# Patient Record
Sex: MALE | Race: WHITE | NOT HISPANIC OR LATINO | Employment: FULL TIME | URBAN - METROPOLITAN AREA
[De-identification: names, ages, dates, MRNs, and addresses within clinical notes are randomized per-mention and may not be internally consistent; named-entity substitution may affect disease eponyms.]

---

## 2017-01-17 ENCOUNTER — LAB (OUTPATIENT)
Dept: PULMONOLOGY | Facility: CLINIC | Age: 57
End: 2017-01-17

## 2017-01-17 DIAGNOSIS — R06.02 SOB (SHORTNESS OF BREATH): Primary | ICD-10-CM

## 2017-01-17 LAB
25(OH)D3 SERPL-MCNC: 42.8 NG/ML
ALBUMIN SERPL-MCNC: 4.6 G/DL (ref 3.2–4.8)
ALBUMIN/GLOB SERPL: 1.5 G/DL (ref 1.5–2.5)
ALP SERPL-CCNC: 116 U/L (ref 25–100)
ALT SERPL W P-5'-P-CCNC: 28 U/L (ref 7–40)
ANION GAP SERPL CALCULATED.3IONS-SCNC: 7 MMOL/L (ref 3–11)
ARTICHOKE IGE QN: 163 MG/DL (ref 0–130)
AST SERPL-CCNC: 20 U/L (ref 0–33)
BASOPHILS # BLD AUTO: 0.06 10*3/MM3 (ref 0–0.2)
BASOPHILS NFR BLD AUTO: 1 % (ref 0–1)
BILIRUB SERPL-MCNC: 0.7 MG/DL (ref 0.3–1.2)
BUN BLD-MCNC: 19 MG/DL (ref 9–23)
BUN/CREAT SERPL: 15.8 (ref 7–25)
CALCIUM SPEC-SCNC: 10.4 MG/DL (ref 8.7–10.4)
CHLORIDE SERPL-SCNC: 104 MMOL/L (ref 99–109)
CHOLEST SERPL-MCNC: 267 MG/DL (ref 0–200)
CO2 SERPL-SCNC: 35 MMOL/L (ref 20–31)
CREAT BLD-MCNC: 1.2 MG/DL (ref 0.6–1.3)
DEPRECATED RDW RBC AUTO: 47 FL (ref 37–54)
EOSINOPHIL # BLD AUTO: 0.12 10*3/MM3 (ref 0.1–0.3)
EOSINOPHIL NFR BLD AUTO: 2 % (ref 0–3)
ERYTHROCYTE [DISTWIDTH] IN BLOOD BY AUTOMATED COUNT: 14.1 % (ref 11.3–14.5)
GFR SERPL CREATININE-BSD FRML MDRD: 63 ML/MIN/1.73
GLOBULIN UR ELPH-MCNC: 3.1 GM/DL
GLUCOSE BLD-MCNC: 97 MG/DL (ref 70–100)
HBA1C MFR BLD: 4.7 % (ref 4.8–5.6)
HCT VFR BLD AUTO: 43.7 % (ref 38.9–50.9)
HDLC SERPL-MCNC: 43 MG/DL (ref 40–60)
HGB BLD-MCNC: 15.4 G/DL (ref 13.1–17.5)
IMM GRANULOCYTES # BLD: 0.01 10*3/MM3 (ref 0–0.03)
IMM GRANULOCYTES NFR BLD: 0.2 % (ref 0–0.6)
LYMPHOCYTES # BLD AUTO: 2.31 10*3/MM3 (ref 0.6–4.8)
LYMPHOCYTES NFR BLD AUTO: 38.1 % (ref 24–44)
MCH RBC QN AUTO: 32 PG (ref 27–31)
MCHC RBC AUTO-ENTMCNC: 35.2 G/DL (ref 32–36)
MCV RBC AUTO: 90.9 FL (ref 80–99)
MONOCYTES # BLD AUTO: 0.66 10*3/MM3 (ref 0–1)
MONOCYTES NFR BLD AUTO: 10.9 % (ref 0–12)
NEUTROPHILS # BLD AUTO: 2.9 10*3/MM3 (ref 1.5–8.3)
NEUTROPHILS NFR BLD AUTO: 47.8 % (ref 41–71)
PHOSPHATE SERPL-MCNC: 3.8 MG/DL (ref 2.4–5.1)
PLATELET # BLD AUTO: 272 10*3/MM3 (ref 150–450)
PMV BLD AUTO: 9 FL (ref 6–12)
POTASSIUM BLD-SCNC: 3.9 MMOL/L (ref 3.5–5.5)
PROT SERPL-MCNC: 7.7 G/DL (ref 5.7–8.2)
RBC # BLD AUTO: 4.81 10*6/MM3 (ref 4.2–5.76)
SODIUM BLD-SCNC: 146 MMOL/L (ref 132–146)
TRIGL SERPL-MCNC: 273 MG/DL (ref 0–150)
TSH SERPL DL<=0.05 MIU/L-ACNC: 1.67 MIU/ML (ref 0.35–5.35)
URATE SERPL-MCNC: 7.1 MG/DL (ref 3.7–9.2)
WBC NRBC COR # BLD: 6.06 10*3/MM3 (ref 3.5–10.8)

## 2017-01-17 PROCEDURE — 84403 ASSAY OF TOTAL TESTOSTERONE: CPT | Performed by: INTERNAL MEDICINE

## 2017-01-17 PROCEDURE — 83735 ASSAY OF MAGNESIUM: CPT | Performed by: INTERNAL MEDICINE

## 2017-01-17 PROCEDURE — 84402 ASSAY OF FREE TESTOSTERONE: CPT | Performed by: INTERNAL MEDICINE

## 2017-01-17 PROCEDURE — 82306 VITAMIN D 25 HYDROXY: CPT | Performed by: INTERNAL MEDICINE

## 2017-01-17 PROCEDURE — 80061 LIPID PANEL: CPT | Performed by: INTERNAL MEDICINE

## 2017-01-17 PROCEDURE — 84550 ASSAY OF BLOOD/URIC ACID: CPT | Performed by: INTERNAL MEDICINE

## 2017-01-17 PROCEDURE — 84443 ASSAY THYROID STIM HORMONE: CPT | Performed by: INTERNAL MEDICINE

## 2017-01-17 PROCEDURE — 80053 COMPREHEN METABOLIC PANEL: CPT | Performed by: INTERNAL MEDICINE

## 2017-01-17 PROCEDURE — 83036 HEMOGLOBIN GLYCOSYLATED A1C: CPT | Performed by: INTERNAL MEDICINE

## 2017-01-17 PROCEDURE — 85025 COMPLETE CBC W/AUTO DIFF WBC: CPT | Performed by: INTERNAL MEDICINE

## 2017-01-17 PROCEDURE — 84100 ASSAY OF PHOSPHORUS: CPT | Performed by: INTERNAL MEDICINE

## 2017-01-18 LAB — MAGNESIUM SERPL-MCNC: 1.9 MG/DL (ref 1.3–2.7)

## 2017-01-20 LAB
TESTOST FREE SERPL-MCNC: 8.9 PG/ML (ref 7.2–24)
TESTOST SERPL-MCNC: 452 NG/DL (ref 348–1197)

## 2017-11-01 ENCOUNTER — LAB (OUTPATIENT)
Dept: PULMONOLOGY | Facility: CLINIC | Age: 57
End: 2017-11-01

## 2017-11-01 DIAGNOSIS — E79.0 HYPERURICEMIA: ICD-10-CM

## 2017-11-01 DIAGNOSIS — R53.83 FATIGUE, UNSPECIFIED TYPE: Primary | ICD-10-CM

## 2017-11-01 DIAGNOSIS — E78.5 HYPERLIPIDEMIA, UNSPECIFIED HYPERLIPIDEMIA TYPE: ICD-10-CM

## 2017-11-01 LAB
ALBUMIN SERPL-MCNC: 4.5 G/DL (ref 3.2–4.8)
ALBUMIN/GLOB SERPL: 1.6 G/DL (ref 1.5–2.5)
ALP SERPL-CCNC: 110 U/L (ref 25–100)
ALT SERPL W P-5'-P-CCNC: 75 U/L (ref 7–40)
ANION GAP SERPL CALCULATED.3IONS-SCNC: 7 MMOL/L (ref 3–11)
ARTICHOKE IGE QN: 180 MG/DL (ref 0–130)
AST SERPL-CCNC: 47 U/L (ref 0–33)
BASOPHILS # BLD AUTO: 0.06 10*3/MM3 (ref 0–0.2)
BASOPHILS NFR BLD AUTO: 0.8 % (ref 0–1)
BILIRUB SERPL-MCNC: 0.6 MG/DL (ref 0.3–1.2)
BUN BLD-MCNC: 20 MG/DL (ref 9–23)
BUN/CREAT SERPL: 16.7 (ref 7–25)
CALCIUM SPEC-SCNC: 9.3 MG/DL (ref 8.7–10.4)
CHLORIDE SERPL-SCNC: 104 MMOL/L (ref 99–109)
CHOLEST SERPL-MCNC: 248 MG/DL (ref 0–200)
CO2 SERPL-SCNC: 28 MMOL/L (ref 20–31)
CREAT BLD-MCNC: 1.2 MG/DL (ref 0.6–1.3)
DEPRECATED FTI SERPL-MCNC: 2.7 TBI
DEPRECATED RDW RBC AUTO: 45.3 FL (ref 37–54)
EOSINOPHIL # BLD AUTO: 0.14 10*3/MM3 (ref 0–0.3)
EOSINOPHIL NFR BLD AUTO: 1.9 % (ref 0–3)
ERYTHROCYTE [DISTWIDTH] IN BLOOD BY AUTOMATED COUNT: 13.8 % (ref 11.3–14.5)
GFR SERPL CREATININE-BSD FRML MDRD: 63 ML/MIN/1.73
GLOBULIN UR ELPH-MCNC: 2.9 GM/DL
GLUCOSE BLD-MCNC: 90 MG/DL (ref 70–100)
HBA1C MFR BLD: 5.4 % (ref 4.8–5.6)
HCT VFR BLD AUTO: 43.7 % (ref 38.9–50.9)
HDLC SERPL-MCNC: 53 MG/DL (ref 40–60)
HGB BLD-MCNC: 15.4 G/DL (ref 13.1–17.5)
IMM GRANULOCYTES # BLD: 0.02 10*3/MM3 (ref 0–0.03)
IMM GRANULOCYTES NFR BLD: 0.3 % (ref 0–0.6)
LYMPHOCYTES # BLD AUTO: 2.62 10*3/MM3 (ref 0.6–4.8)
LYMPHOCYTES NFR BLD AUTO: 36.1 % (ref 24–44)
MCH RBC QN AUTO: 31.4 PG (ref 27–31)
MCHC RBC AUTO-ENTMCNC: 35.2 G/DL (ref 32–36)
MCV RBC AUTO: 89.2 FL (ref 80–99)
MONOCYTES # BLD AUTO: 0.68 10*3/MM3 (ref 0–1)
MONOCYTES NFR BLD AUTO: 9.4 % (ref 0–12)
NEUTROPHILS # BLD AUTO: 3.73 10*3/MM3 (ref 1.5–8.3)
NEUTROPHILS NFR BLD AUTO: 51.5 % (ref 41–71)
PLATELET # BLD AUTO: 228 10*3/MM3 (ref 150–450)
PMV BLD AUTO: 8.6 FL (ref 6–12)
POTASSIUM BLD-SCNC: 3.8 MMOL/L (ref 3.5–5.5)
PROT SERPL-MCNC: 7.4 G/DL (ref 5.7–8.2)
RBC # BLD AUTO: 4.9 10*6/MM3 (ref 4.2–5.76)
SODIUM BLD-SCNC: 139 MMOL/L (ref 132–146)
T3RU NFR SERPL: 35.5 % (ref 23–37)
T4 SERPL-MCNC: 7.6 MCG/DL (ref 4.7–11.4)
TRIGL SERPL-MCNC: 200 MG/DL (ref 0–150)
TSH SERPL DL<=0.05 MIU/L-ACNC: 2.43 MIU/ML (ref 0.35–5.35)
URATE SERPL-MCNC: 8 MG/DL (ref 3.7–9.2)
VIT B12 BLD-MCNC: 640 PG/ML (ref 211–911)
WBC NRBC COR # BLD: 7.25 10*3/MM3 (ref 3.5–10.8)

## 2017-11-01 PROCEDURE — 84479 ASSAY OF THYROID (T3 OR T4): CPT | Performed by: INTERNAL MEDICINE

## 2017-11-01 PROCEDURE — 82607 VITAMIN B-12: CPT | Performed by: INTERNAL MEDICINE

## 2017-11-01 PROCEDURE — 80053 COMPREHEN METABOLIC PANEL: CPT | Performed by: INTERNAL MEDICINE

## 2017-11-01 PROCEDURE — 84443 ASSAY THYROID STIM HORMONE: CPT | Performed by: INTERNAL MEDICINE

## 2017-11-01 PROCEDURE — 84436 ASSAY OF TOTAL THYROXINE: CPT | Performed by: INTERNAL MEDICINE

## 2017-11-01 PROCEDURE — 84402 ASSAY OF FREE TESTOSTERONE: CPT | Performed by: INTERNAL MEDICINE

## 2017-11-01 PROCEDURE — 82306 VITAMIN D 25 HYDROXY: CPT | Performed by: INTERNAL MEDICINE

## 2017-11-01 PROCEDURE — 84403 ASSAY OF TOTAL TESTOSTERONE: CPT | Performed by: INTERNAL MEDICINE

## 2017-11-01 PROCEDURE — 84550 ASSAY OF BLOOD/URIC ACID: CPT | Performed by: INTERNAL MEDICINE

## 2017-11-01 PROCEDURE — 85025 COMPLETE CBC W/AUTO DIFF WBC: CPT | Performed by: INTERNAL MEDICINE

## 2017-11-01 PROCEDURE — 83036 HEMOGLOBIN GLYCOSYLATED A1C: CPT | Performed by: INTERNAL MEDICINE

## 2017-11-01 PROCEDURE — 80061 LIPID PANEL: CPT | Performed by: INTERNAL MEDICINE

## 2017-11-02 LAB — 25(OH)D3 SERPL-MCNC: 39 NG/ML

## 2017-11-04 LAB
TESTOST FREE SERPL-MCNC: 12.2 PG/ML (ref 7.2–24)
TESTOST SERPL-MCNC: 422 NG/DL (ref 264–916)

## 2018-02-09 DIAGNOSIS — E79.0 HYPERURICEMIA: Primary | ICD-10-CM

## 2018-02-09 PROCEDURE — 84550 ASSAY OF BLOOD/URIC ACID: CPT | Performed by: NURSE PRACTITIONER

## 2018-02-09 PROCEDURE — 80076 HEPATIC FUNCTION PANEL: CPT | Performed by: NURSE PRACTITIONER

## 2019-01-21 DIAGNOSIS — Z20.5 EXPOSURE TO HEPATITIS A: ICD-10-CM

## 2019-01-21 DIAGNOSIS — Z00.00 ANNUAL PHYSICAL EXAM: ICD-10-CM

## 2019-01-21 DIAGNOSIS — E79.0 HYPERURICEMIA: ICD-10-CM

## 2019-01-21 DIAGNOSIS — Z20.5 EXPOSURE TO HEPATITIS: ICD-10-CM

## 2019-01-21 DIAGNOSIS — E53.8 VITAMIN B 12 DEFICIENCY: ICD-10-CM

## 2019-01-21 DIAGNOSIS — Z20.5 EXPOSURE TO HEPATITIS: Primary | ICD-10-CM

## 2019-01-21 DIAGNOSIS — E55.9 VITAMIN D INSUFFICIENCY: Primary | ICD-10-CM

## 2019-01-21 DIAGNOSIS — E78.5 DYSLIPIDEMIA: ICD-10-CM

## 2019-01-21 DIAGNOSIS — Z00.00 PHYSICAL EXAM: ICD-10-CM

## 2019-01-21 LAB
25(OH)D3 SERPL-MCNC: 38.8 NG/ML
ALBUMIN SERPL-MCNC: 4.47 G/DL (ref 3.2–4.8)
ALBUMIN/GLOB SERPL: 1.9 G/DL (ref 1.5–2.5)
ALP SERPL-CCNC: 109 U/L (ref 25–100)
ALT SERPL W P-5'-P-CCNC: 62 U/L (ref 7–40)
ANION GAP SERPL CALCULATED.3IONS-SCNC: 7 MMOL/L (ref 3–11)
ARTICHOKE IGE QN: 216 MG/DL (ref 0–130)
AST SERPL-CCNC: 36 U/L (ref 0–33)
BASOPHILS # BLD AUTO: 0.04 10*3/MM3 (ref 0–0.2)
BASOPHILS NFR BLD AUTO: 0.6 % (ref 0–1)
BILIRUB SERPL-MCNC: 0.6 MG/DL (ref 0.3–1.2)
BUN BLD-MCNC: 13 MG/DL (ref 9–23)
BUN/CREAT SERPL: 11.8 (ref 7–25)
CALCIUM SPEC-SCNC: 9.4 MG/DL (ref 8.7–10.4)
CHLORIDE SERPL-SCNC: 106 MMOL/L (ref 99–109)
CHOLEST SERPL-MCNC: 266 MG/DL (ref 0–200)
CO2 SERPL-SCNC: 24 MMOL/L (ref 20–31)
CREAT BLD-MCNC: 1.1 MG/DL (ref 0.6–1.3)
DEPRECATED RDW RBC AUTO: 45.8 FL (ref 37–54)
EOSINOPHIL # BLD AUTO: 0.17 10*3/MM3 (ref 0–0.3)
EOSINOPHIL NFR BLD AUTO: 2.5 % (ref 0–3)
ERYTHROCYTE [DISTWIDTH] IN BLOOD BY AUTOMATED COUNT: 13.8 % (ref 11.3–14.5)
FERRITIN SERPL-MCNC: 193 NG/ML (ref 22–322)
GFR SERPL CREATININE-BSD FRML MDRD: 69 ML/MIN/1.73
GLOBULIN UR ELPH-MCNC: 2.3 GM/DL
GLUCOSE BLD-MCNC: 104 MG/DL (ref 70–100)
HAV IGM SERPL QL IA: NORMAL
HAV IGM SERPL QL IA: NORMAL
HBA1C MFR BLD: 5.5 % (ref 4.8–5.6)
HBV CORE IGM SERPL QL IA: NORMAL
HBV SURFACE AG SERPL QL IA: NORMAL
HCT VFR BLD AUTO: 44.5 % (ref 38.9–50.9)
HCV AB SER DONR QL: NORMAL
HDLC SERPL-MCNC: 42 MG/DL (ref 40–60)
HGB BLD-MCNC: 15.1 G/DL (ref 13.1–17.5)
IMM GRANULOCYTES # BLD AUTO: 0.01 10*3/MM3 (ref 0–0.03)
IMM GRANULOCYTES NFR BLD AUTO: 0.1 % (ref 0–0.6)
LYMPHOCYTES # BLD AUTO: 2.15 10*3/MM3 (ref 0.6–4.8)
LYMPHOCYTES NFR BLD AUTO: 32.1 % (ref 24–44)
MCH RBC QN AUTO: 31.2 PG (ref 27–31)
MCHC RBC AUTO-ENTMCNC: 33.9 G/DL (ref 32–36)
MCV RBC AUTO: 91.9 FL (ref 80–99)
MONOCYTES # BLD AUTO: 0.66 10*3/MM3 (ref 0–1)
MONOCYTES NFR BLD AUTO: 9.9 % (ref 0–12)
NEUTROPHILS # BLD AUTO: 3.67 10*3/MM3 (ref 1.5–8.3)
NEUTROPHILS NFR BLD AUTO: 54.9 % (ref 41–71)
PLATELET # BLD AUTO: 232 10*3/MM3 (ref 150–450)
PMV BLD AUTO: 9.1 FL (ref 6–12)
POTASSIUM BLD-SCNC: 4.3 MMOL/L (ref 3.5–5.5)
PROT SERPL-MCNC: 6.8 G/DL (ref 5.7–8.2)
RBC # BLD AUTO: 4.84 10*6/MM3 (ref 4.2–5.76)
SODIUM BLD-SCNC: 137 MMOL/L (ref 132–146)
TRIGL SERPL-MCNC: 179 MG/DL (ref 0–150)
TSH SERPL DL<=0.05 MIU/L-ACNC: 2.96 MIU/ML (ref 0.35–5.35)
URATE SERPL-MCNC: 7.5 MG/DL (ref 3.7–9.2)
VIT B12 BLD-MCNC: 747 PG/ML (ref 211–911)
WBC NRBC COR # BLD: 6.69 10*3/MM3 (ref 3.5–10.8)

## 2019-01-21 PROCEDURE — 82607 VITAMIN B-12: CPT | Performed by: INTERNAL MEDICINE

## 2019-01-21 PROCEDURE — 80061 LIPID PANEL: CPT | Performed by: INTERNAL MEDICINE

## 2019-01-21 PROCEDURE — 80053 COMPREHEN METABOLIC PANEL: CPT | Performed by: INTERNAL MEDICINE

## 2019-01-21 PROCEDURE — 85025 COMPLETE CBC W/AUTO DIFF WBC: CPT | Performed by: INTERNAL MEDICINE

## 2019-01-21 PROCEDURE — 83036 HEMOGLOBIN GLYCOSYLATED A1C: CPT | Performed by: INTERNAL MEDICINE

## 2019-01-21 PROCEDURE — 86709 HEPATITIS A IGM ANTIBODY: CPT | Performed by: INTERNAL MEDICINE

## 2019-01-21 PROCEDURE — 84550 ASSAY OF BLOOD/URIC ACID: CPT | Performed by: INTERNAL MEDICINE

## 2019-01-21 PROCEDURE — 86708 HEPATITIS A ANTIBODY: CPT | Performed by: INTERNAL MEDICINE

## 2019-01-21 PROCEDURE — 84443 ASSAY THYROID STIM HORMONE: CPT | Performed by: INTERNAL MEDICINE

## 2019-01-21 PROCEDURE — 82306 VITAMIN D 25 HYDROXY: CPT | Performed by: INTERNAL MEDICINE

## 2019-01-21 PROCEDURE — 82728 ASSAY OF FERRITIN: CPT | Performed by: INTERNAL MEDICINE

## 2019-01-21 PROCEDURE — 80074 ACUTE HEPATITIS PANEL: CPT | Performed by: INTERNAL MEDICINE

## 2019-01-22 LAB — HAV AB SER QL IA: NEGATIVE

## 2019-01-30 DIAGNOSIS — R79.89 ABNORMAL LIVER FUNCTION TESTS: Primary | ICD-10-CM

## 2019-02-01 ENCOUNTER — HOSPITAL ENCOUNTER (OUTPATIENT)
Dept: ULTRASOUND IMAGING | Facility: HOSPITAL | Age: 59
Discharge: HOME | End: 2019-02-01
Attending: INTERNAL MEDICINE | Admitting: INTERNAL MEDICINE

## 2019-02-01 DIAGNOSIS — R79.89 ABNORMAL LIVER FUNCTION TESTS: ICD-10-CM

## 2019-02-01 PROCEDURE — 76705 ECHO EXAM OF ABDOMEN: CPT

## 2019-08-26 PROCEDURE — 84550 ASSAY OF BLOOD/URIC ACID: CPT | Performed by: INTERNAL MEDICINE

## 2019-08-26 PROCEDURE — 86140 C-REACTIVE PROTEIN: CPT | Performed by: INTERNAL MEDICINE

## 2019-08-26 PROCEDURE — 80053 COMPREHEN METABOLIC PANEL: CPT | Performed by: INTERNAL MEDICINE

## 2019-08-26 PROCEDURE — 80061 LIPID PANEL: CPT | Performed by: INTERNAL MEDICINE

## 2019-08-26 PROCEDURE — 85652 RBC SED RATE AUTOMATED: CPT | Performed by: INTERNAL MEDICINE

## 2020-02-27 PROCEDURE — 80061 LIPID PANEL: CPT | Performed by: INTERNAL MEDICINE

## 2020-02-27 PROCEDURE — 83036 HEMOGLOBIN GLYCOSYLATED A1C: CPT | Performed by: INTERNAL MEDICINE

## 2020-02-27 PROCEDURE — 84550 ASSAY OF BLOOD/URIC ACID: CPT | Performed by: INTERNAL MEDICINE

## 2020-02-27 PROCEDURE — 84443 ASSAY THYROID STIM HORMONE: CPT | Performed by: INTERNAL MEDICINE

## 2020-02-27 PROCEDURE — 80053 COMPREHEN METABOLIC PANEL: CPT | Performed by: INTERNAL MEDICINE

## 2020-02-27 PROCEDURE — 82306 VITAMIN D 25 HYDROXY: CPT | Performed by: INTERNAL MEDICINE

## 2020-02-27 PROCEDURE — 85652 RBC SED RATE AUTOMATED: CPT | Performed by: INTERNAL MEDICINE

## 2020-02-27 PROCEDURE — 82607 VITAMIN B-12: CPT | Performed by: INTERNAL MEDICINE

## 2020-02-27 PROCEDURE — 83735 ASSAY OF MAGNESIUM: CPT | Performed by: INTERNAL MEDICINE

## 2020-02-27 PROCEDURE — 85025 COMPLETE CBC W/AUTO DIFF WBC: CPT | Performed by: INTERNAL MEDICINE

## 2020-02-27 PROCEDURE — 86140 C-REACTIVE PROTEIN: CPT | Performed by: INTERNAL MEDICINE

## 2020-12-03 ENCOUNTER — LAB (OUTPATIENT)
Dept: PULMONOLOGY | Facility: CLINIC | Age: 60
End: 2020-12-03

## 2020-12-03 DIAGNOSIS — E53.8 VITAMIN B 12 DEFICIENCY: ICD-10-CM

## 2020-12-03 DIAGNOSIS — Z00.00 ANNUAL PHYSICAL EXAM: Primary | ICD-10-CM

## 2020-12-03 DIAGNOSIS — Z00.00 PHYSICAL EXAM: Primary | ICD-10-CM

## 2020-12-03 DIAGNOSIS — E78.5 DYSLIPIDEMIA: ICD-10-CM

## 2020-12-03 LAB
25(OH)D3 SERPL-MCNC: 54.9 NG/ML (ref 30–100)
ALBUMIN SERPL-MCNC: 4.3 G/DL (ref 3.5–5.2)
ALBUMIN/GLOB SERPL: 1.3 G/DL
ALP SERPL-CCNC: 129 U/L (ref 39–117)
ALT SERPL W P-5'-P-CCNC: 48 U/L (ref 1–41)
ANION GAP SERPL CALCULATED.3IONS-SCNC: 9.3 MMOL/L (ref 5–15)
AST SERPL-CCNC: 38 U/L (ref 1–40)
BILIRUB SERPL-MCNC: 0.5 MG/DL (ref 0–1.2)
BUN SERPL-MCNC: 15 MG/DL (ref 6–20)
BUN/CREAT SERPL: 15.5 (ref 7–25)
CALCIUM SPEC-SCNC: 9.4 MG/DL (ref 8.6–10.5)
CHLORIDE SERPL-SCNC: 104 MMOL/L (ref 98–107)
CHOLEST SERPL-MCNC: 145 MG/DL (ref 0–200)
CO2 SERPL-SCNC: 26.7 MMOL/L (ref 22–29)
CREAT SERPL-MCNC: 0.97 MG/DL (ref 0.76–1.27)
GFR SERPL CREATININE-BSD FRML MDRD: 80 ML/MIN/1.73
GLOBULIN UR ELPH-MCNC: 3.3 GM/DL
GLUCOSE SERPL-MCNC: 84 MG/DL (ref 65–99)
HBA1C MFR BLD: 5.52 % (ref 4.8–5.6)
HDLC SERPL-MCNC: 50 MG/DL (ref 40–60)
LDLC SERPL CALC-MCNC: 74 MG/DL (ref 0–100)
LDLC/HDLC SERPL: 1.44 {RATIO}
POTASSIUM SERPL-SCNC: 4.8 MMOL/L (ref 3.5–5.2)
PROT SERPL-MCNC: 7.6 G/DL (ref 6–8.5)
SODIUM SERPL-SCNC: 140 MMOL/L (ref 136–145)
TRIGL SERPL-MCNC: 116 MG/DL (ref 0–150)
TSH SERPL DL<=0.05 MIU/L-ACNC: 1.93 UIU/ML (ref 0.27–4.2)
URATE SERPL-MCNC: 6.1 MG/DL (ref 3.4–7)
VIT B12 BLD-MCNC: 775 PG/ML (ref 211–946)
VLDLC SERPL-MCNC: 21 MG/DL (ref 5–40)

## 2020-12-03 PROCEDURE — 82607 VITAMIN B-12: CPT | Performed by: INTERNAL MEDICINE

## 2020-12-03 PROCEDURE — 80053 COMPREHEN METABOLIC PANEL: CPT | Performed by: INTERNAL MEDICINE

## 2020-12-03 PROCEDURE — 84443 ASSAY THYROID STIM HORMONE: CPT | Performed by: INTERNAL MEDICINE

## 2020-12-03 PROCEDURE — 86769 SARS-COV-2 COVID-19 ANTIBODY: CPT | Performed by: INTERNAL MEDICINE

## 2020-12-03 PROCEDURE — 80061 LIPID PANEL: CPT | Performed by: INTERNAL MEDICINE

## 2020-12-03 PROCEDURE — 85025 COMPLETE CBC W/AUTO DIFF WBC: CPT | Performed by: INTERNAL MEDICINE

## 2020-12-03 PROCEDURE — 36415 COLL VENOUS BLD VENIPUNCTURE: CPT | Performed by: INTERNAL MEDICINE

## 2020-12-03 PROCEDURE — 84550 ASSAY OF BLOOD/URIC ACID: CPT | Performed by: INTERNAL MEDICINE

## 2020-12-03 PROCEDURE — 82306 VITAMIN D 25 HYDROXY: CPT | Performed by: INTERNAL MEDICINE

## 2020-12-03 PROCEDURE — 86480 TB TEST CELL IMMUN MEASURE: CPT | Performed by: INTERNAL MEDICINE

## 2020-12-03 PROCEDURE — 83036 HEMOGLOBIN GLYCOSYLATED A1C: CPT | Performed by: INTERNAL MEDICINE

## 2020-12-04 LAB
BASOPHILS # BLD AUTO: 0.06 10*3/MM3 (ref 0–0.2)
BASOPHILS NFR BLD AUTO: 1 % (ref 0–1.5)
DEPRECATED RDW RBC AUTO: 43.6 FL (ref 37–54)
EOSINOPHIL # BLD AUTO: 0.11 10*3/MM3 (ref 0–0.4)
EOSINOPHIL NFR BLD AUTO: 1.7 % (ref 0.3–6.2)
ERYTHROCYTE [DISTWIDTH] IN BLOOD BY AUTOMATED COUNT: 13.2 % (ref 12.3–15.4)
HCT VFR BLD AUTO: 43.9 % (ref 37.5–51)
HGB BLD-MCNC: 15.1 G/DL (ref 13–17.7)
IMM GRANULOCYTES # BLD AUTO: 0.01 10*3/MM3 (ref 0–0.05)
IMM GRANULOCYTES NFR BLD AUTO: 0.2 % (ref 0–0.5)
LYMPHOCYTES # BLD AUTO: 1.56 10*3/MM3 (ref 0.7–3.1)
LYMPHOCYTES NFR BLD AUTO: 24.8 % (ref 19.6–45.3)
MCH RBC QN AUTO: 31.3 PG (ref 26.6–33)
MCHC RBC AUTO-ENTMCNC: 34.4 G/DL (ref 31.5–35.7)
MCV RBC AUTO: 90.9 FL (ref 79–97)
MONOCYTES # BLD AUTO: 0.58 10*3/MM3 (ref 0.1–0.9)
MONOCYTES NFR BLD AUTO: 9.2 % (ref 5–12)
NEUTROPHILS NFR BLD AUTO: 3.98 10*3/MM3 (ref 1.7–7)
NEUTROPHILS NFR BLD AUTO: 63.1 % (ref 42.7–76)
NRBC BLD AUTO-RTO: 0 /100 WBC (ref 0–0.2)
PLATELET # BLD AUTO: 245 10*3/MM3 (ref 140–450)
PMV BLD AUTO: 9.3 FL (ref 6–12)
RBC # BLD AUTO: 4.83 10*6/MM3 (ref 4.14–5.8)
WBC # BLD AUTO: 6.3 10*3/MM3 (ref 3.4–10.8)

## 2020-12-08 LAB
GAMMA INTERFERON BACKGROUND BLD IA-ACNC: 0.83 IU/ML
M TB IFN-G BLD-IMP: POSITIVE
M TB IFN-G CD4+ BCKGRND COR BLD-ACNC: >10 IU/ML
M TB IFN-G CD4+CD8+ BCKGRND COR BLD-ACNC: 8.11 IU/ML
MITOGEN IGNF BLD-ACNC: >10 IU/ML
SERVICE CMNT-IMP: ABNORMAL

## 2020-12-10 LAB
REF LAB TEST RESULTS: NORMAL

## 2020-12-14 ENCOUNTER — IMMUNIZATION (OUTPATIENT)
Dept: VACCINE CLINIC | Facility: HOSPITAL | Age: 60
End: 2020-12-14

## 2020-12-14 PROCEDURE — 0001A: CPT

## 2020-12-14 PROCEDURE — 91300 HC SARSCOV02 VAC 30MCG/0.3ML IM: CPT

## 2020-12-17 ENCOUNTER — APPOINTMENT (OUTPATIENT)
Dept: VACCINE CLINIC | Facility: HOSPITAL | Age: 60
End: 2020-12-17

## 2021-01-04 ENCOUNTER — IMMUNIZATION (OUTPATIENT)
Dept: VACCINE CLINIC | Facility: HOSPITAL | Age: 61
End: 2021-01-04

## 2021-01-04 PROCEDURE — 0002A: CPT | Performed by: INTERNAL MEDICINE

## 2021-01-04 PROCEDURE — 91300 HC SARSCOV02 VAC 30MCG/0.3ML IM: CPT | Performed by: INTERNAL MEDICINE

## 2021-01-04 PROCEDURE — 0001A: CPT | Performed by: INTERNAL MEDICINE

## 2021-05-21 ENCOUNTER — LAB (OUTPATIENT)
Dept: PULMONOLOGY | Facility: CLINIC | Age: 61
End: 2021-05-21

## 2021-05-21 DIAGNOSIS — Z00.00 PHYSICAL EXAM: Primary | ICD-10-CM

## 2021-05-21 DIAGNOSIS — R53.83 FATIGUE, UNSPECIFIED TYPE: ICD-10-CM

## 2021-05-21 DIAGNOSIS — E55.9 VITAMIN D INSUFFICIENCY: ICD-10-CM

## 2021-05-21 DIAGNOSIS — Z00.00 ANNUAL PHYSICAL EXAM: ICD-10-CM

## 2021-05-21 DIAGNOSIS — E53.8 VITAMIN B 12 DEFICIENCY: ICD-10-CM

## 2021-05-21 DIAGNOSIS — E79.0 HYPERURICEMIA: ICD-10-CM

## 2021-05-21 DIAGNOSIS — E78.5 DYSLIPIDEMIA: ICD-10-CM

## 2021-05-21 DIAGNOSIS — R79.89 ABNORMAL LIVER FUNCTION TESTS: ICD-10-CM

## 2021-05-21 DIAGNOSIS — Z51.81 MEDICATION MONITORING ENCOUNTER: ICD-10-CM

## 2021-05-21 LAB
BASOPHILS # BLD AUTO: 0.06 10*3/MM3 (ref 0–0.2)
BASOPHILS NFR BLD AUTO: 0.9 % (ref 0–1.5)
DEPRECATED RDW RBC AUTO: 43.7 FL (ref 37–54)
EOSINOPHIL # BLD AUTO: 0.15 10*3/MM3 (ref 0–0.4)
EOSINOPHIL NFR BLD AUTO: 2.3 % (ref 0.3–6.2)
ERYTHROCYTE [DISTWIDTH] IN BLOOD BY AUTOMATED COUNT: 13.1 % (ref 12.3–15.4)
HBA1C MFR BLD: 5.56 % (ref 4.8–5.6)
HCT VFR BLD AUTO: 45.4 % (ref 37.5–51)
HGB BLD-MCNC: 15.6 G/DL (ref 13–17.7)
IMM GRANULOCYTES # BLD AUTO: 0.01 10*3/MM3 (ref 0–0.05)
IMM GRANULOCYTES NFR BLD AUTO: 0.2 % (ref 0–0.5)
LYMPHOCYTES # BLD AUTO: 2.13 10*3/MM3 (ref 0.7–3.1)
LYMPHOCYTES NFR BLD AUTO: 32.6 % (ref 19.6–45.3)
MCH RBC QN AUTO: 31.5 PG (ref 26.6–33)
MCHC RBC AUTO-ENTMCNC: 34.4 G/DL (ref 31.5–35.7)
MCV RBC AUTO: 91.7 FL (ref 79–97)
MONOCYTES # BLD AUTO: 0.9 10*3/MM3 (ref 0.1–0.9)
MONOCYTES NFR BLD AUTO: 13.8 % (ref 5–12)
NEUTROPHILS NFR BLD AUTO: 3.29 10*3/MM3 (ref 1.7–7)
NEUTROPHILS NFR BLD AUTO: 50.2 % (ref 42.7–76)
NRBC BLD AUTO-RTO: 0 /100 WBC (ref 0–0.2)
PLATELET # BLD AUTO: 251 10*3/MM3 (ref 140–450)
PMV BLD AUTO: 9.4 FL (ref 6–12)
RBC # BLD AUTO: 4.95 10*6/MM3 (ref 4.14–5.8)
WBC # BLD AUTO: 6.54 10*3/MM3 (ref 3.4–10.8)

## 2021-05-21 PROCEDURE — 82746 ASSAY OF FOLIC ACID SERUM: CPT | Performed by: INTERNAL MEDICINE

## 2021-05-21 PROCEDURE — 84153 ASSAY OF PSA TOTAL: CPT | Performed by: INTERNAL MEDICINE

## 2021-05-21 PROCEDURE — 80061 LIPID PANEL: CPT | Performed by: INTERNAL MEDICINE

## 2021-05-21 PROCEDURE — 83036 HEMOGLOBIN GLYCOSYLATED A1C: CPT | Performed by: INTERNAL MEDICINE

## 2021-05-21 PROCEDURE — 82607 VITAMIN B-12: CPT | Performed by: INTERNAL MEDICINE

## 2021-05-21 PROCEDURE — 80053 COMPREHEN METABOLIC PANEL: CPT | Performed by: INTERNAL MEDICINE

## 2021-05-21 PROCEDURE — 84443 ASSAY THYROID STIM HORMONE: CPT | Performed by: INTERNAL MEDICINE

## 2021-05-21 PROCEDURE — 84550 ASSAY OF BLOOD/URIC ACID: CPT | Performed by: INTERNAL MEDICINE

## 2021-05-21 PROCEDURE — 82306 VITAMIN D 25 HYDROXY: CPT | Performed by: INTERNAL MEDICINE

## 2021-05-21 PROCEDURE — 85025 COMPLETE CBC W/AUTO DIFF WBC: CPT | Performed by: INTERNAL MEDICINE

## 2021-05-22 LAB
25(OH)D3 SERPL-MCNC: 57.4 NG/ML (ref 30–100)
ALBUMIN SERPL-MCNC: 4.4 G/DL (ref 3.5–5.2)
ALBUMIN/GLOB SERPL: 1.4 G/DL
ALP SERPL-CCNC: 124 U/L (ref 39–117)
ALT SERPL W P-5'-P-CCNC: 64 U/L (ref 1–41)
ANION GAP SERPL CALCULATED.3IONS-SCNC: 11.8 MMOL/L (ref 5–15)
AST SERPL-CCNC: 45 U/L (ref 1–40)
BILIRUB SERPL-MCNC: 0.8 MG/DL (ref 0–1.2)
BUN SERPL-MCNC: 13 MG/DL (ref 6–20)
BUN/CREAT SERPL: 11.5 (ref 7–25)
CALCIUM SPEC-SCNC: 9.5 MG/DL (ref 8.6–10.5)
CHLORIDE SERPL-SCNC: 104 MMOL/L (ref 98–107)
CHOLEST SERPL-MCNC: 150 MG/DL (ref 0–200)
CO2 SERPL-SCNC: 25.2 MMOL/L (ref 22–29)
CREAT SERPL-MCNC: 1.13 MG/DL (ref 0.76–1.27)
FOLATE SERPL-MCNC: 14.5 NG/ML (ref 4.78–24.2)
GFR SERPL CREATININE-BSD FRML MDRD: 67 ML/MIN/1.73
GLOBULIN UR ELPH-MCNC: 3.2 GM/DL
GLUCOSE SERPL-MCNC: 97 MG/DL (ref 65–99)
HDLC SERPL-MCNC: 43 MG/DL (ref 40–60)
LDLC SERPL CALC-MCNC: 87 MG/DL (ref 0–100)
LDLC/HDLC SERPL: 1.97 {RATIO}
POTASSIUM SERPL-SCNC: 4.2 MMOL/L (ref 3.5–5.2)
PROT SERPL-MCNC: 7.6 G/DL (ref 6–8.5)
PSA SERPL-MCNC: 0.88 NG/ML (ref 0–4)
SODIUM SERPL-SCNC: 141 MMOL/L (ref 136–145)
TRIGL SERPL-MCNC: 111 MG/DL (ref 0–150)
TSH SERPL DL<=0.05 MIU/L-ACNC: 1.68 UIU/ML (ref 0.27–4.2)
URATE SERPL-MCNC: 5.5 MG/DL (ref 3.4–7)
VIT B12 BLD-MCNC: 1287 PG/ML (ref 211–946)
VLDLC SERPL-MCNC: 20 MG/DL (ref 5–40)

## 2021-09-02 ENCOUNTER — TRANSCRIBE ORDERS (OUTPATIENT)
Dept: PHYSICAL THERAPY | Facility: HOSPITAL | Age: 61
End: 2021-09-02

## 2021-09-02 ENCOUNTER — HOSPITAL ENCOUNTER (OUTPATIENT)
Dept: PHYSICAL THERAPY | Facility: HOSPITAL | Age: 61
Setting detail: THERAPIES SERIES
Discharge: HOME | End: 2021-09-02

## 2021-09-02 DIAGNOSIS — M54.9 BACK PAIN, UNSPECIFIED BACK LOCATION, UNSPECIFIED BACK PAIN LATERALITY, UNSPECIFIED CHRONICITY: Primary | ICD-10-CM

## 2021-09-02 DIAGNOSIS — M25.551 RIGHT HIP PAIN: Primary | ICD-10-CM

## 2021-09-02 DIAGNOSIS — M79.18 PIRIFORMIS MUSCLE PAIN: ICD-10-CM

## 2021-09-02 PROCEDURE — 97161 PT EVAL LOW COMPLEX 20 MIN: CPT | Performed by: GENERAL ACUTE CARE HOSPITAL

## 2021-09-02 NOTE — THERAPY EVALUATION
Outpatient Physical Therapy Ortho Initial Evaluation   [place]     Patient Name: Rey Jamison  : 1960  MRN: [id number]  Today's Date: 2021      Visit Date: 2021    There is no problem list on file for this patient.       No past medical history on file.     No past surgical history on file.    Visit Dx:     ICD-10-CM ICD-9-CM   1. Right hip pain  M25.551 719.45   2. Piriformis muscle pain  M79.18 729.1         Patient History     Row Name 21 1545             History    Chief Complaint  Pain  -HP      Type of Pain  Hip pain;Lower Extremity / Leg  -HP      Date Current Problem(s) Began  -- 2 months ago  -HP      Brief Description of Current Complaint  Patient states that about 2 months ago he noticed increased right gluteal region pain which increased the most with sitting and standing.  He states that he has noticed walking improved his pain.  Within the last month he has also noticed left hip/gluteal region pain in the same area as the right.  He has noticed some paresthesia in his right foot when his pain increases the most.  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  Sitting, standing  -      What Performance Factors Make the Current Problem(s) BETTER?  Walking/movement  -      Pain Comments  Patient states that in his right gluteal region he has noticed increased pain with sitting/standing for extended period of time.  -HP      Tolerance Time- Standing  Increases pain with increased time  -HP      Tolerance Time-  Sitting  30 minutes to 1 hour increases pain  -HP      Tolerance Time- Walking  Improved pain  -HP      Tolerance Time- Lying  WNL  -HP      Is your sleep disturbed?  No  -HP      Difficulties at work?  Mild discomfort  -HP      Difficulties with recreational activities?  Improved with running  -HP         Fall Risk Assessment    Any falls in the past year:  No  -HP         Daily Activities    Pt Participated in POC and Goals  Yes  -HP        User Key  (r) = Recorded By, (t) = Taken By, (c) = Cosigned By    Initials Name Provider Type    HP Kirill Eden PT Physical Therapist          PT Ortho     Row Name 09/02/21 1545       Precautions and Contraindications    Precautions/Limitations  no known precautions/limitations  -HP       Subjective Pain    Able to rate subjective pain?  yes  -HP       Posture/Observations    Posture/Observations Comments  Patient with mild TTP along the right gluteal region specifically at the piriformis.  No pain with hip scouring.  -HP       Quarter Clearing    Quarter Clearing  Lower Quarter Clearing  -HP       Sensory Screen for Light Touch- Lower Quarter Clearing    L1 (inguinal area)  Intact  -HP    L2 (anterior mid thigh)  Intact  -HP    L3 (distal anterior thigh)  Intact  -HP    L4 (medial lower leg/foot)  Intact  -HP    L5 (lateral lower leg/great toe)  Intact  -HP    S1 (bottom of foot)  Intact  -HP       Myotomal Screen- Lower Quarter Clearing    Hip flexion (L2)  Bilateral:;WNL  -HP    Knee extension (L3)  Bilateral:;WNL  -HP    Ankle DF (L4)  Bilateral:;WNL  -HP    Great toe extension (L5)  Bilateral:;WNL  -HP    Ankle PF (S1)  Bilateral:;WNL  -HP    Knee flexion (S2)  Bilateral:;WNL  -HP       General ROM    RT Lower Ext  Rt Hip External Rotation;Rt Hip Internal Rotation  -HP       Right Lower Ext    Rt Hip External Rotation AROM  Mild limitation  -HP    Rt Hip Internal Rotation AROM  Mild limitation  -HP       MMT (Manual Muscle Testing)    General MMT Comments  No strength  deficits noted in lower extremities  -      User Key  (r) = Recorded By, (t) = Taken By, (c) = Cosigned By    Initials Name Provider Type     Kirill Eden, PT Physical Therapist                      Therapy Education  Education Details: HEP included figure 4 stretch towards contra hip, piriformis stretch and figure 4 lower extremity toward body, supine hip external rotation stretch  Given: HEP, Symptoms/condition management, Pain management, Posture/body mechanics  Program: New  How Provided: Verbal, Demonstration  Provided to: Patient  Level of Understanding: Teach back education performed, Verbalized, Demonstrated     PT OP Goals     Row Name 09/02/21 1545          PT Short Term Goals    STG Date to Achieve  09/23/21  -HP     STG 1  Patient report improvement of symptoms by 50% or greater.  -HP     STG 1 Progress  New  -     STG 2  Patient to report adherence to HEP.  -HP     STG 2 Progress  New  -HP     STG 3  Patient to report ability to sit for greater than 30 minutes without increased pain/paresthesia in the right foot.  -HP     STG 3 Progress  New  -        Long Term Goals    LTG Date to Achieve  10/14/21  -HP     LTG 1  Patient to report improvement of symptoms by 75% or greater.  -HP     LTG 1 Progress  New  -     LTG 2  Patient to report independence with HEP.  -HP     LTG 2 Progress  New  -HP     LTG 3  Patient report return to sitting and standing for extended period of time without increase in symptoms.  -HP     LTG 3 Progress  New  -        Time Calculation    PT Goal Re-Cert Due Date  12/01/21  -       User Key  (r) = Recorded By, (t) = Taken By, (c) = Cosigned By    Initials Name Provider Type     Kirill Eden, PT Physical Therapist          PT Assessment/Plan     Row Name 09/02/21 1545          PT Assessment    Functional Limitations  Other (comment) Decreased tolerance to sitting and standing  -     Impairments  Pain;Peripheral nerve integrity  -     Assessment Comments   Patient is a 60-year-old male that presents with a low complexity and evolving case of right hip/gluteal region pain.  Patient states that about 2 months ago he noticed increased pain with sitting for extended period of time and standing for extended period of time.  He also notes that with extended sitting/standing he has some paresthesia in the right foot.  About 1 month ago he notes that the same pain has been occurring on the left side as well.  Upon evaluation, his signs and symptoms are consistent with right piriformis/left piriformis tightness causing irritation to the surrounding nerve.  Patient to benefit from PT services with a focus on increasing flexibility in this region and providing relief to the irritated nerve.  -     Please refer to paper survey for additional self-reported information  Yes  -HP     Rehab Potential  Good  -HP     Patient/caregiver participated in establishment of treatment plan and goals  Yes  -     Patient would benefit from skilled therapy intervention  Yes  -HP        PT Plan    PT Frequency  1x/week  -     Predicted Duration of Therapy Intervention (PT)  8-10 visits  -     Planned CPT's?  PT EVAL LOW COMPLEXITY: 14139;PT THER PROC EA 15 MIN: 30018;PT THER ACT EA 15 MIN: 26037;PT MANUAL THERAPY EA 15 MIN: 40262  -     Physical Therapy Interventions (Optional Details)  gross motor skills;home exercise program;joint mobilization;manual therapy techniques;modalities;patient/family education;postural re-education;ROM (Range of Motion);strengthening;stretching  -     PT Plan Comments  Patient to benefit from PT services with a focus on decreasing pain, increasing piriformis flexibility, and improving tolerance to sitting/standing.  -       User Key  (r) = Recorded By, (t) = Taken By, (c) = Cosigned By    Initials Name Provider Type     Kirill Eden PT Physical Therapist            OP Exercises     Row Name 09/02/21 3307             Subjective Pain    Able to rate  subjective pain?  yes  -        User Key  (r) = Recorded By, (t) = Taken By, (c) = Cosigned By    Initials Name Provider Type    HP Kirill Eden, PT Physical Therapist                        Outcome Measure Options: Modifed Owestry  Modified Oswestry  Modified Oswestry Score/Comments: 6%(3/50)      Time Calculation:     Start Time: 1545  Untimed Charges  PT Eval/Re-eval Minutes: 60  Total Minutes  Untimed Charges Total Minutes: 60   Total Minutes: 60     Therapy Charges for Today     Code Description Service Date Service Provider Modifiers Qty    26055232518 HC PT EVAL LOW COMPLEXITY 4 9/2/2021 Kirill Eden, PT GP 1          PT G-Codes  Outcome Measure Options: Modifed Owestry  Modified Oswestry Score/Comments: 6%(3/50)         Kirill Eden PT  9/2/2021

## 2021-09-16 ENCOUNTER — HOSPITAL ENCOUNTER (OUTPATIENT)
Dept: PHYSICAL THERAPY | Facility: HOSPITAL | Age: 61
Setting detail: THERAPIES SERIES
Discharge: HOME | End: 2021-09-16

## 2021-09-16 DIAGNOSIS — M25.551 RIGHT HIP PAIN: Primary | ICD-10-CM

## 2021-09-16 DIAGNOSIS — M79.18 PIRIFORMIS MUSCLE PAIN: ICD-10-CM

## 2021-09-16 PROCEDURE — 97110 THERAPEUTIC EXERCISES: CPT | Performed by: GENERAL ACUTE CARE HOSPITAL

## 2021-09-16 PROCEDURE — 97140 MANUAL THERAPY 1/> REGIONS: CPT | Performed by: GENERAL ACUTE CARE HOSPITAL

## 2021-09-16 NOTE — THERAPY TREATMENT NOTE
Outpatient Physical Therapy Ortho Treatment Note   [place]     Patient Name: Rey Jamison  : 1960  MRN: [id number]  Today's Date: 2021      Visit Date: 2021    Visit Dx:    ICD-10-CM ICD-9-CM   1. Right hip pain  M25.551 719.45   2. Piriformis muscle pain  M79.18 729.1       There is no problem list on file for this patient.       No past medical history on file.     No past surgical history on file.                    PT Assessment/Plan     Row Name 21 1445          PT Assessment    Assessment Comments  Pt did very well with today's exercises and currently with his course of care. He has had significant improvement in his symptoms and overall ability to perform ADLs.   -HP        PT Plan    PT Plan Comments  Continue per POC and prepare pt for d.c at next visit if appropriate.  -HP       User Key  (r) = Recorded By, (t) = Taken By, (c) = Cosigned By    Initials Name Provider Type    Kirill Crockett PT Physical Therapist            OP Exercises     Row Name 21 1545 21 1445          Subjective Comments    Subjective Comments  Pt stated that he has not had increased pain or shooting pain in the last week   -HP  --        Subjective Pain    Able to rate subjective pain?  yes  -HP  --     Pre-Treatment Pain Level  0  -HP  --     Post-Treatment Pain Level  0  -HP  --        Total Minutes    59952 - PT Therapeutic Exercise Minutes  20  -HP  --     17560 - PT Manual Therapy Minutes  10  -HP  --  -HP        Exercise 1    Exercise Name 1  Yellow band: prone hip ext, clams, reverse clams, SLR, prone knee flexion  -HP  --     Reps 1  15 each  -HP  --        Exercise 2    Exercise Name 2  Fwd and sideway golfers with 10# KB  -HP  --     Reps 2  15 each  -HP  --       User Key  (r) = Recorded By, (t) = Taken By, (c) = Cosigned By    Initials Name Provider Type    Kirill Crockett PT Physical Therapist                      Manual Rx (last 36 hours)      Manual Treatments     Row  Name 09/16/21 1545 09/16/21 1445          Total Minutes    98924 - PT Manual Therapy Minutes  10  -HP  --  -HP        Manual Rx 1    Manual Rx 1 Location  Lumbar paraspinals and gluteal region   -HP  --  -HP     Manual Rx 1 Type  Soft tissue mobilization with the use of tools with light to moderate pressure with pt in prone  -HP  --  -HP     Manual Rx 1 Duration  10  -HP  --  -HP       User Key  (r) = Recorded By, (t) = Taken By, (c) = Cosigned By    Initials Name Provider Type     Kirill Edne, PT Physical Therapist                             Time Calculation:   Start Time: 1545  Timed Charges  79164 - PT Therapeutic Exercise Minutes: 20  26047 - PT Manual Therapy Minutes: 10  Total Minutes  Timed Charges Total Minutes: 30   Total Minutes: 30  Therapy Charges for Today     Code Description Service Date Service Provider Modifiers Qty    37834673319  PT THER PROC EA 15 MIN 9/16/2021 Kirill Eden, PT GP 1    [id number]  PT MANUAL THERAPY EA 15 MIN 9/16/2021 Kiirll Eden, PT GP 1                    Kirill Eden PT  9/16/2021

## 2021-09-21 DIAGNOSIS — Z20.822 CLOSE EXPOSURE TO COVID-19 VIRUS: Primary | ICD-10-CM

## 2021-09-24 ENCOUNTER — CLINICAL SUPPORT (OUTPATIENT)
Dept: PULMONOLOGY | Facility: CLINIC | Age: 61
End: 2021-09-24

## 2021-09-24 DIAGNOSIS — Z20.822 CLOSE EXPOSURE TO COVID-19 VIRUS: ICD-10-CM

## 2021-09-24 PROCEDURE — 36415 COLL VENOUS BLD VENIPUNCTURE: CPT | Performed by: NURSE PRACTITIONER

## 2021-09-24 PROCEDURE — 86769 SARS-COV-2 COVID-19 ANTIBODY: CPT | Performed by: INTERNAL MEDICINE

## 2021-09-25 LAB
SARS-COV-2 AB SERPL IA-ACNC: >2500 U/ML
SARS-COV-2 AB SERPL-IMP: POSITIVE

## 2021-09-30 ENCOUNTER — HOSPITAL ENCOUNTER (OUTPATIENT)
Dept: PHYSICAL THERAPY | Facility: HOSPITAL | Age: 61
Setting detail: THERAPIES SERIES
Discharge: HOME | End: 2021-09-30

## 2021-09-30 DIAGNOSIS — M25.551 RIGHT HIP PAIN: Primary | ICD-10-CM

## 2021-09-30 DIAGNOSIS — M79.18 PIRIFORMIS MUSCLE PAIN: ICD-10-CM

## 2021-09-30 PROCEDURE — 97110 THERAPEUTIC EXERCISES: CPT | Performed by: GENERAL ACUTE CARE HOSPITAL

## 2021-09-30 PROCEDURE — 97140 MANUAL THERAPY 1/> REGIONS: CPT | Performed by: GENERAL ACUTE CARE HOSPITAL

## 2021-10-07 ENCOUNTER — HOSPITAL ENCOUNTER (OUTPATIENT)
Dept: PHYSICAL THERAPY | Facility: HOSPITAL | Age: 61
Setting detail: THERAPIES SERIES
Discharge: HOME | End: 2021-10-07

## 2021-10-07 DIAGNOSIS — M25.551 RIGHT HIP PAIN: Primary | ICD-10-CM

## 2021-10-07 DIAGNOSIS — M79.18 PIRIFORMIS MUSCLE PAIN: ICD-10-CM

## 2021-10-07 PROCEDURE — 97110 THERAPEUTIC EXERCISES: CPT | Performed by: GENERAL ACUTE CARE HOSPITAL

## 2021-10-07 NOTE — THERAPY DISCHARGE NOTE
Outpatient Physical Therapy Ortho Progress Note/Discharge Summary   [place]     Patient Name: Rey Jamison  : 1960  MRN: [id number]  Today's Date: 10/7/2021      Visit Date: 10/07/2021    Visit Dx:    ICD-10-CM ICD-9-CM   1. Right hip pain  M25.551 719.45   2. Piriformis muscle pain  M79.18 729.1       There is no problem list on file for this patient.       No past medical history on file.     No past surgical history on file.                    PT Assessment/Plan     Row Name 10/07/21 0900          PT Assessment    Assessment Comments  Patient stated that over the last week he has not had any pain in the low back/gluteal region.  He states that he feels significantly better and states that he is 95% improved from his initial evaluation.  He also stated that he felt completely comfortable with an independent HEP program to progress on his own.  I believe that he will do very well with the exercises that were given in his HEP and he will continue to see improvements with time.  -     Please refer to paper survey for additional self-reported information  Yes  -HP     Rehab Potential  Good  -     Patient/caregiver participated in establishment of treatment plan and goals  Yes  -     Patient would benefit from skilled therapy intervention  No  -HP        PT Plan    PT Plan Comments  Patient to be discharged to an independent HEP program with a focus on stretching of the gluteal region musculature and extension based exercises.  Patient to follow-up if symptoms do not continue to get better with time.  -       User Key  (r) = Recorded By, (t) = Taken By, (c) = Cosigned By    Initials Name Provider Type    Kirill Crockett, PT Physical Therapist              OP Exercises     Row Name 10/07/21 0900             Subjective Comments    Subjective Comments  Patient stated that he was feeling very well today and has not had any pain in his low back just some tightness in his musculature  -          Subjective Pain    Able to rate subjective pain?  yes  -HP      Pre-Treatment Pain Level  0  -HP      Post-Treatment Pain Level  0  -HP         Total Minutes    29989 - PT Therapeutic Exercise Minutes  15  -HP         Exercise 1    Exercise Name 1  NuStep  -HP      Time 1  5 minutes  -HP         Exercise 2    Exercise Name 2  Time to update HEP, plan of care, DC planning  -      Time 2  10 minutes  -        User Key  (r) = Recorded By, (t) = Taken By, (c) = Cosigned By    Initials Name Provider Type     Kirill Eden, PT Physical Therapist                         PT OP Goals     Row Name 10/07/21 0900          PT Short Term Goals    STG Date to Achieve  09/23/21  -HP     STG 1  Patient report improvement of symptoms by 50% or greater.  -HP     STG 1 Progress  Met  -HP     STG 2  Patient to report adherence to HEP.  -HP     STG 2 Progress  Met  -HP     STG 3  Patient to report ability to sit for greater than 30 minutes without increased pain/paresthesia in the right foot.  -HP     STG 3 Progress  Met  -        Long Term Goals    LTG Date to Achieve  10/14/21  -HP     LTG 1  Patient to report improvement of symptoms by 75% or greater.  -HP     LTG 1 Progress  Met  -HP     LTG 1 Progress Comments  95%  -HP     LTG 2  Patient to report independence with HEP.  -HP     LTG 2 Progress  Met  -HP     LTG 3  Patient report return to sitting and standing for extended period of time without increase in symptoms.  -     LTG 3 Progress  Met  -HP        Time Calculation    PT Goal Re-Cert Due Date  12/01/21  -       User Key  (r) = Recorded By, (t) = Taken By, (c) = Cosigned By    Initials Name Provider Type    Kirill Crockett, [facility] Physical Therapist          Therapy Education  Education Details: HEP included: Prone press ups, LTR, single-leg bridge, cat camel's, bird dogs, child's pose, piriformis stretch x3, red band three-way hip  Given: HEP, Symptoms/condition management, Pain management  Program: Reinforced,  Progressed  How Provided: Verbal, Demonstration, Written  Provided to: Patient  Level of Understanding: Teach back education performed, Verbalized, Demonstrated    Outcome Measure Options: Modifed Owestry  Modified Oswestry  Modified Oswestry Score/Comments: 0%      Time Calculation:   Start Time: 0900  Timed Charges  75187 - PT Therapeutic Exercise Minutes: 15  Total Minutes  Timed Charges Total Minutes: 15   Total Minutes: 15  Therapy Charges for Today     Code Description Service Date Service Provider Modifiers Qty    02179617009 HC PT THER PROC EA 15 MIN 10/7/2021 Kirill Eden, PT GP 1          PT G-Codes  Outcome Measure Options: Modifed Owestry  Modified Oswestry Score/Comments: 0%     OP PT Discharge Summary  Date of Discharge: 10/07/21  Reason for Discharge: All goals achieved, Independent  Outcomes Achieved: Able to achieve all goals within established timeline  Discharge Destination: Home with home program  Discharge Instructions/Additional Comments: Patient discharged to an independent HEP program in order to continue to progress independently and with time.  Patient to schedule follow-up visits if he feels it is warranted and/or symptoms worsen.  I believe that he will do well and his prognosis at time of discharge is good.      Kirill Eden, PT  10/7/2021

## 2021-10-08 ENCOUNTER — IMMUNIZATION (OUTPATIENT)
Dept: VACCINE CLINIC | Facility: HOSPITAL | Age: 61
End: 2021-10-08

## 2021-10-08 PROCEDURE — 91300 HC SARSCOV02 VAC 30MCG/0.3ML IM: CPT | Performed by: INTERNAL MEDICINE

## 2021-10-08 PROCEDURE — 0003A: CPT | Performed by: INTERNAL MEDICINE

## 2021-10-08 PROCEDURE — 0004A ADM SARSCOV2 30MCG/0.3ML BOOSTER: CPT | Performed by: INTERNAL MEDICINE

## 2021-11-20 ENCOUNTER — HOSPITAL ENCOUNTER (EMERGENCY)
Facility: HOSPITAL | Age: 61
Discharge: HOME | End: 2021-11-20
Attending: EMERGENCY MEDICINE | Admitting: EMERGENCY MEDICINE

## 2021-11-20 ENCOUNTER — APPOINTMENT (OUTPATIENT)
Dept: CT IMAGING | Facility: HOSPITAL | Age: 61
End: 2021-11-20

## 2021-11-20 VITALS
SYSTOLIC BLOOD PRESSURE: 153 MMHG | HEIGHT: 67 IN | WEIGHT: 170 LBS | HEART RATE: 83 BPM | TEMPERATURE: 98 F | DIASTOLIC BLOOD PRESSURE: 97 MMHG | RESPIRATION RATE: 18 BRPM | BODY MASS INDEX: 26.68 KG/M2 | OXYGEN SATURATION: 96 %

## 2021-11-20 DIAGNOSIS — S09.90XA CLOSED HEAD INJURY, INITIAL ENCOUNTER: Primary | ICD-10-CM

## 2021-11-20 DIAGNOSIS — S01.81XA LACERATION OF FOREHEAD, INITIAL ENCOUNTER: ICD-10-CM

## 2021-11-20 PROCEDURE — 99284 EMERGENCY DEPT VISIT MOD MDM: CPT

## 2021-11-20 PROCEDURE — 70450 CT HEAD/BRAIN W/O DYE: CPT

## 2021-11-20 PROCEDURE — 99283 EMERGENCY DEPT VISIT LOW MDM: CPT

## 2021-11-20 RX ORDER — LIDOCAINE HYDROCHLORIDE AND EPINEPHRINE 10; 10 MG/ML; UG/ML
10 INJECTION, SOLUTION INFILTRATION; PERINEURAL ONCE
Status: COMPLETED | OUTPATIENT
Start: 2021-11-20 | End: 2021-11-20

## 2021-11-20 RX ADMIN — TRANEXAMIC ACID 500 MG: 100 INJECTION, SOLUTION INTRAVENOUS at 22:17

## 2021-11-20 RX ADMIN — LIDOCAINE HYDROCHLORIDE,EPINEPHRINE BITARTRATE 10 ML: 10; .01 INJECTION, SOLUTION INFILTRATION; PERINEURAL at 22:13

## 2021-11-21 NOTE — ED NOTES
AVS printed and reviewed  No questions at this time.    IV removed    Pr ambulated to exit w/ family w/o complications.       Kacy Yi RN  11/20/21 6733

## 2021-11-21 NOTE — ED PROVIDER NOTES
[facility]    EMERGENCY DEPARTMENT ENCOUNTER      Pt Name: Rey Jamison  MRN: [id number]  YOB: 1960  Date of evaluation: 11/20/2021  Provider: George Salinas DO    CHIEF COMPLAINT       Chief Complaint   Patient presents with   • Fall     pt fell in shower, lac to to rt forehead EMS 1000MG TXA IV en route         HISTORY OF PRESENT ILLNESS  (Location/Symptom, Timing/Onset, Context/Setting, Quality, Duration, Modifying Factors, Severity.)   Rey Jamison is a 60 y.o. male who presents to the emergency department for evaluation secondary to a fall, mechanical in nature while in the shower just prior to arrival.  He struck his right frontal forehead on the ground and corner of the tub.  No loss of consciousness.  Significant amount of bleeding after the incident.  He did hold direct pressure to the area, and was transported to the hospital for further work-up and evaluation.  Is not on any blood thinning medications, he denies any significant headache, no neck pain, no other extremity injury or pain, no numbness or tingling.  No other acute complaints.  Denies any issues with bleeding control in the past.  Tetanus status is up-to-date.  No other acute complaints.      Nursing notes were reviewed.    REVIEW OF SYSTEMS    (2-9 systems for level 4, 10 or more for level 5)   ROS:  General:  No fevers, no chills, no weakness  Cardiovascular:  No chest pain, no palpitations  Respiratory:  No shortness of breath, no cough, no wheezing  Gastrointestinal:  No pain, no nausea, no vomiting, no diarrhea  Musculoskeletal:  No muscle pain, no joint pain  Skin:  No rash  Neurologic:  No headache  Psychiatric:  No anxiety    Except as noted above the remainder of the review of systems was reviewed and negative.       PAST MEDICAL HISTORY   No past medical history on file.      SURGICAL HISTORY     No past surgical history on file.      CURRENT MEDICATIONS       Current Facility-Administered Medications:   •   "Tranexamic Acid Sterile Solution 500 mg, 500 mg, Topical, Once, George Salinas, DO    Current Outpatient Medications:   •  febuxostat (ULORIC) 40 MG tablet, Take 1 tablet by mouth Daily., Disp: 90 tablet, Rfl: 1  •  Scopolamine (TRANSDERM-SCOP, 1.5 MG,) 1.5 MG/3DAYS patch, Use as directed, Disp: 8 patch, Rfl: 0  •  Scopolamine (TRANSDERM-SCOP, 1.5 MG,) 1.5 MG/3DAYS patch, Place 1 patch on the skin Every 72 (Seventy-Two) Hours., Disp: 4 each, Rfl: 0  •  Tdap (BOOSTRIX) 5-2.5-18.5 LF-MCG/0.5 injection, Inject  into the appropriate muscle as directed by prescriber., Disp: 0.5 mL, Rfl: 0    ALLERGIES     Patient has no known allergies.    FAMILY HISTORY     No family history on file.       SOCIAL HISTORY       Social History     Socioeconomic History   • Marital status:          PHYSICAL EXAM    (up to 7 for level 4, 8 or more for level 5)     Vitals:    11/20/21 2138 11/20/21 2202 11/20/21 2206 11/20/21 2230   BP: 132/86 129/81     BP Location:  Right arm     Patient Position:  Lying     Pulse:  92     Resp:  16     Temp:  98.1 °F (36.7 °C)     TempSrc:       SpO2: 96% 96%  97%   Weight:   77.1 kg (170 lb)    Height:   170.2 cm (67\")        Physical Exam  HENT:      Head:        Comments: There is a 3 cm laceration in the right frontal forehead as depicted above.  Within the medial aspect of the laceration there is a pulsating arterial bleeding noted.      General : Patient is awake, alert, oriented, in no acute distress, GCS 15, answering questions appropriately  HEENT: Pupils are equally round and reactive to light, EOMI, conjunctivae clear, sclerae white  Neck: Neck is supple, full range of motion, trachea midline, no midline cervical tenderness  Cardiac: Heart regular rate, rhythm  Lungs: Lungs with no acute respiratory distress  Abdomen: Abdomen is soft, nontender, nondistended. There are no firm or pulsatile masses, no rebound rigidity or guarding.   Musculoskeletal: 5 out of 5 strength in all 4 " "extremities.   Neuro: Motor intact, sensory intact, level of consciousness is normal, GCS 15  Dermatology: Right frontal scalp laceration as noted above, otherwise skin is warm and dry  Psych: Mentation is grossly normal, cognition is grossly normal. Affect is appropriate.      DIAGNOSTIC RESULTS     EKG: All EKGs are interpreted by the Emergency Department Physician who either signs or Co-signs this chart in the absence of a cardiologist.    No orders to display       RADIOLOGY:   Non-plain film images such as CT, Ultrasound and MRI are read by the radiologist. Plain radiographic images are visualized and preliminarily interpreted by the emergency physician with the below findings:      [] Radiologist's Report Reviewed:  CT Head Without Contrast   Final Result   No acute intracranial abnormality.               Signer Name: Mert Chávez MD    Signed: 11/20/2021 10:17 PM    Workstation Name: [email or web address]     [facility]            ED BEDSIDE ULTRASOUND:   Performed by ED Physician - none    LABS:    I have reviewed and interpreted all of the currently available lab results from this visit (if applicable):  Results for orders placed or performed in visit on 09/24/21   SARS-CoV-2 Semi-Quant Total Ab    Specimen: Arm, Left; Blood   Result Value Ref Range    SARS-CoV-2 Semi-Quant Ab >2500.0 Negative<0.8 U/mL    SARS-COV-2 SPIKE AB INTERP Positive         All other labs were within normal range or not returned as of this dictation.      EMERGENCY DEPARTMENT COURSE and DIFFERENTIAL DIAGNOSIS/MDM:   Vitals:    Vitals:    11/20/21 2138 11/20/21 2202 11/20/21 2206 11/20/21 2230   BP: 132/86 129/81     BP Location:  Right arm     Patient Position:  Lying     Pulse:  92     Resp:  16     Temp:  98.1 °F (36.7 °C)     TempSrc:       SpO2: 96% 96%  97%   Weight:   77.1 kg (170 lb)    Height:   170.2 cm (67\")             Patient presents status post a incident which occurred just prior to arrival with a " right frontal forehead laceration from blunt trauma injury.  No loss of conscious.  On arrival the patient has a pressure dressing applied, EMS gave 1 g of TXA in route secondary to concern for the active bleeding.  On arrival was at the patient's bedside, with lidocaine/epinephrine, we achieved anesthesia to this area, 3 figure-of-eight stitches were directly sutured around the arterial bleeding site to achieve hemostasis.  CT of the head obtained.  Imaging is unremarkable.  On reevaluation hemostasis is achieved.  We discussed continuing with wound care, ice, close head injury precautions discussed.  Follow-up in 5 to 7 days for wound check, suture removal and reevaluation.  Return precautions discussed.  I encouraged the patient to return to the emergency department immediately for ANY concerns, worsening, new complaints, or if symptoms persist and unable to seek follow-up in a timely fashion.  The patient/family expressed understanding and agreement with this plan.  The patient will follow-up with their PCP in 1-2 days for reevaluation.       MEDICATIONS ADMINISTERED IN ED:  Medications   Tranexamic Acid Sterile Solution 500 mg (500 mg Topical Not Given 11/20/21 2217)   lidocaine 1% - EPINEPHrine 1:596710 (XYLOCAINE W/EPI) 1 %-1:708835 injection 10 mL (10 mL Injection Given 11/20/21 2213)       PROCEDURES:  Procedures   Procedure Note - Laceration repair:  Questions were sought and answered and verbal consent was given by patient for the procedure. The area was prepped and draped in standard bedside fashion. The wound area was anesthetized with 5ml of Lidocaine 1% with epinephrine. The wound was explored with No foreign bodies found. The wound was repaired with 3 silk 2-0 figure-of-eight, 2 4-0 Ethilon sutures. In total 5 sutures were used. The patient tolerated the procedure well without complications and my repeat exam post-procedure is unchanged.    Wound care and scar minimization education was provided.  Instructions were given to return for increasing pain, redness, streaking, discharge, or any other worsening or worrisome concerns.      CRITICAL CARE TIME    Total Critical Care time was 0 minutes, excluding separately reportable procedures.   There was a high probability of clinically significant/life threatening deterioration in the patient's condition which required my urgent intervention.      FINAL IMPRESSION      1. Closed head injury, initial encounter    2. Laceration of forehead, initial encounter          DISPOSITION/PLAN     ED Disposition     ED Disposition Condition Comment    Discharge Stable           PATIENT REFERRED TO:  Rey Jamison  [address]  [address]  [place] [address]  [phone]    In 1 week  For wound re-check, For suture removal    [facility]  [address] [place] [id number] [phone]    If symptoms worsen      DISCHARGE MEDICATIONS:     Medication List      CONTINUE taking these medications    Boostrix 5-2.5-18.5 LF-MCG/0.5 injection  Generic drug: Tdap  Inject  into the appropriate muscle as directed by prescriber.     febuxostat 40 MG tablet  Commonly known as: ULORIC  Take 1 tablet by mouth Daily.     * Transderm-Scop (1.5 MG) 1 MG/3DAYS patch  Generic drug: Scopolamine  Use as directed     * Transderm-Scop (1.5 MG) 1 MG/3DAYS patch  Generic drug: Scopolamine  Place 1 patch on the skin Every 72 (Seventy-Two) Hours.         * This list has 2 medication(s) that are the same as other medications prescribed for you. Read the directions carefully, and ask your doctor or other care provider to review them with you.                    Comment: Please note this report has been produced using speech recognition software.      George Salinas DO  Attending Emergency Physician               George Salinas DO  11/20/21 5616

## 2022-03-09 ENCOUNTER — LAB (OUTPATIENT)
Dept: PULMONOLOGY | Facility: CLINIC | Age: 62
End: 2022-03-09

## 2022-03-09 DIAGNOSIS — Z20.822 EXPOSURE TO COVID-19 VIRUS: Primary | ICD-10-CM

## 2022-03-09 DIAGNOSIS — Z20.822 EXPOSURE TO COVID-19 VIRUS: ICD-10-CM

## 2022-03-09 PROCEDURE — 86769 SARS-COV-2 COVID-19 ANTIBODY: CPT | Performed by: NURSE PRACTITIONER

## 2022-03-11 LAB
SARS-COV-2 AB SERPL IA-ACNC: >2500 U/ML
SARS-COV-2 AB SERPL QL IA: NEGATIVE
SARS-COV-2 AB SERPL-IMP: POSITIVE

## 2022-05-13 ENCOUNTER — IMMUNIZATION (OUTPATIENT)
Dept: VACCINE CLINIC | Facility: HOSPITAL | Age: 62
End: 2022-05-13

## 2022-05-13 DIAGNOSIS — Z23 NEED FOR VACCINATION: Primary | ICD-10-CM

## 2022-05-13 PROCEDURE — 91305 HC SARSCOV2 VAC 30 MCG TRS-SUCR PFIZER: CPT | Performed by: INTERNAL MEDICINE

## 2022-05-13 PROCEDURE — 0054A HC ADM SARSCV2 30MCG TRS-SUCR BOOSTER: CPT | Performed by: INTERNAL MEDICINE

## 2022-05-16 ENCOUNTER — LAB (OUTPATIENT)
Dept: PULMONOLOGY | Facility: CLINIC | Age: 62
End: 2022-05-16

## 2022-05-16 DIAGNOSIS — M19.90 OSTEOARTHRITIS, UNSPECIFIED OSTEOARTHRITIS TYPE, UNSPECIFIED SITE: ICD-10-CM

## 2022-05-16 DIAGNOSIS — R79.89 ABNORMAL LIVER FUNCTION TESTS: Primary | ICD-10-CM

## 2022-05-16 DIAGNOSIS — R79.89 ABNORMAL LIVER FUNCTION TESTS: ICD-10-CM

## 2022-05-16 LAB
BASOPHILS # BLD AUTO: 0.06 10*3/MM3 (ref 0–0.2)
BASOPHILS NFR BLD AUTO: 0.7 % (ref 0–1.5)
DEPRECATED RDW RBC AUTO: 44.4 FL (ref 37–54)
EOSINOPHIL # BLD AUTO: 0.18 10*3/MM3 (ref 0–0.4)
EOSINOPHIL NFR BLD AUTO: 2.2 % (ref 0.3–6.2)
ERYTHROCYTE [DISTWIDTH] IN BLOOD BY AUTOMATED COUNT: 14 % (ref 12.3–15.4)
HBA1C MFR BLD: 5.7 % (ref 4.8–5.6)
HCT VFR BLD AUTO: 42.6 % (ref 37.5–51)
HGB BLD-MCNC: 14.9 G/DL (ref 13–17.7)
IMM GRANULOCYTES # BLD AUTO: 0.02 10*3/MM3 (ref 0–0.05)
IMM GRANULOCYTES NFR BLD AUTO: 0.2 % (ref 0–0.5)
LYMPHOCYTES # BLD AUTO: 1.51 10*3/MM3 (ref 0.7–3.1)
LYMPHOCYTES NFR BLD AUTO: 18.4 % (ref 19.6–45.3)
MCH RBC QN AUTO: 30.4 PG (ref 26.6–33)
MCHC RBC AUTO-ENTMCNC: 35 G/DL (ref 31.5–35.7)
MCV RBC AUTO: 86.9 FL (ref 79–97)
MONOCYTES # BLD AUTO: 0.89 10*3/MM3 (ref 0.1–0.9)
MONOCYTES NFR BLD AUTO: 10.8 % (ref 5–12)
NEUTROPHILS NFR BLD AUTO: 5.56 10*3/MM3 (ref 1.7–7)
NEUTROPHILS NFR BLD AUTO: 67.7 % (ref 42.7–76)
NRBC BLD AUTO-RTO: 0 /100 WBC (ref 0–0.2)
PLATELET # BLD AUTO: 226 10*3/MM3 (ref 140–450)
PMV BLD AUTO: 9.1 FL (ref 6–12)
RBC # BLD AUTO: 4.9 10*6/MM3 (ref 4.14–5.8)
WBC NRBC COR # BLD: 8.22 10*3/MM3 (ref 3.4–10.8)

## 2022-05-16 PROCEDURE — 84153 ASSAY OF PSA TOTAL: CPT | Performed by: NURSE PRACTITIONER

## 2022-05-16 PROCEDURE — 82607 VITAMIN B-12: CPT | Performed by: NURSE PRACTITIONER

## 2022-05-16 PROCEDURE — 82306 VITAMIN D 25 HYDROXY: CPT | Performed by: NURSE PRACTITIONER

## 2022-05-16 PROCEDURE — 80061 LIPID PANEL: CPT | Performed by: NURSE PRACTITIONER

## 2022-05-16 PROCEDURE — 80050 GENERAL HEALTH PANEL: CPT | Performed by: INTERNAL MEDICINE

## 2022-05-16 PROCEDURE — 84550 ASSAY OF BLOOD/URIC ACID: CPT | Performed by: NURSE PRACTITIONER

## 2022-05-16 PROCEDURE — 82746 ASSAY OF FOLIC ACID SERUM: CPT | Performed by: NURSE PRACTITIONER

## 2022-05-16 PROCEDURE — 83036 HEMOGLOBIN GLYCOSYLATED A1C: CPT | Performed by: NURSE PRACTITIONER

## 2022-05-16 PROCEDURE — 82977 ASSAY OF GGT: CPT | Performed by: NURSE PRACTITIONER

## 2022-05-17 LAB
25(OH)D3 SERPL-MCNC: 65.4 NG/ML (ref 30–100)
ALBUMIN SERPL-MCNC: 4.4 G/DL (ref 3.5–5.2)
ALBUMIN/GLOB SERPL: 1.3 G/DL
ALP SERPL-CCNC: 114 U/L (ref 39–117)
ALT SERPL W P-5'-P-CCNC: 56 U/L (ref 1–41)
ANION GAP SERPL CALCULATED.3IONS-SCNC: 9.6 MMOL/L (ref 5–15)
AST SERPL-CCNC: 46 U/L (ref 1–40)
BILIRUB SERPL-MCNC: 0.6 MG/DL (ref 0–1.2)
BUN SERPL-MCNC: 11 MG/DL (ref 6–20)
BUN/CREAT SERPL: 9.6 (ref 7–25)
CALCIUM SPEC-SCNC: 9.9 MG/DL (ref 8.6–10.5)
CHLORIDE SERPL-SCNC: 108 MMOL/L (ref 98–107)
CHOLEST SERPL-MCNC: 139 MG/DL (ref 0–200)
CO2 SERPL-SCNC: 23.4 MMOL/L (ref 22–29)
CREAT SERPL-MCNC: 1.15 MG/DL (ref 0.76–1.27)
EGFRCR SERPLBLD CKD-EPI 2021: 73.3 ML/MIN/1.73
FOLATE SERPL-MCNC: >20 NG/ML (ref 4.78–24.2)
GGT SERPL-CCNC: 33 U/L (ref 8–61)
GLOBULIN UR ELPH-MCNC: 3.5 GM/DL
GLUCOSE SERPL-MCNC: 111 MG/DL (ref 65–99)
HDLC SERPL-MCNC: 43 MG/DL (ref 40–60)
LDLC SERPL CALC-MCNC: 74 MG/DL (ref 0–100)
LDLC/HDLC SERPL: 1.67 {RATIO}
POTASSIUM SERPL-SCNC: 3.8 MMOL/L (ref 3.5–5.2)
PROT SERPL-MCNC: 7.9 G/DL (ref 6–8.5)
PSA SERPL-MCNC: 0.28 NG/ML (ref 0–4)
SODIUM SERPL-SCNC: 141 MMOL/L (ref 136–145)
TRIGL SERPL-MCNC: 122 MG/DL (ref 0–150)
TSH SERPL DL<=0.05 MIU/L-ACNC: 2.16 UIU/ML (ref 0.27–4.2)
URATE SERPL-MCNC: 6.2 MG/DL (ref 3.4–7)
VIT B12 BLD-MCNC: 814 PG/ML (ref 211–946)
VLDLC SERPL-MCNC: 22 MG/DL (ref 5–40)

## 2022-09-30 ENCOUNTER — IMMUNIZATION (OUTPATIENT)
Dept: VACCINE CLINIC | Facility: HOSPITAL | Age: 62
End: 2022-09-30

## 2022-09-30 DIAGNOSIS — Z23 NEED FOR VACCINATION: Primary | ICD-10-CM

## 2022-09-30 PROCEDURE — 0124A: CPT | Performed by: HOSPITALIST

## 2022-09-30 PROCEDURE — 91312 HC SARSCOV2 VAC 30MCG/0.3ML IM BIVALENT BOOSTER 12 YRS AND OLDER: CPT | Performed by: HOSPITALIST

## 2022-12-06 RX ORDER — ATORVASTATIN CALCIUM 40 MG/1
TABLET, FILM COATED ORAL
Qty: 90 TABLET | Refills: 3 | Status: SHIPPED | OUTPATIENT
Start: 2022-12-06

## 2023-08-12 ENCOUNTER — LAB (OUTPATIENT)
Dept: LAB | Facility: HOSPITAL | Age: 63
End: 2023-08-12
Payer: COMMERCIAL

## 2023-08-12 DIAGNOSIS — E78.5 DYSLIPIDEMIA: ICD-10-CM

## 2023-08-12 DIAGNOSIS — E55.9 VITAMIN D INSUFFICIENCY: ICD-10-CM

## 2023-08-12 DIAGNOSIS — Z00.00 ROUTINE GENERAL MEDICAL EXAMINATION AT A HEALTH CARE FACILITY: ICD-10-CM

## 2023-08-12 DIAGNOSIS — M1A.9XX0 CHRONIC GOUT WITHOUT TOPHUS, UNSPECIFIED CAUSE, UNSPECIFIED SITE: Primary | ICD-10-CM

## 2023-08-12 LAB
25(OH)D3 SERPL-MCNC: 64.3 NG/ML (ref 30–100)
CHOLEST SERPL-MCNC: 142 MG/DL (ref 0–200)
CRP SERPL-MCNC: <0.3 MG/DL (ref 0–0.5)
HBA1C MFR BLD: 5.6 % (ref 4.8–5.6)
HDLC SERPL-MCNC: 40 MG/DL (ref 40–60)
LDLC SERPL CALC-MCNC: 74 MG/DL (ref 0–100)
LDLC/HDLC SERPL: 1.72 {RATIO}
PSA SERPL-MCNC: 1.07 NG/ML (ref 0–4)
TRIGL SERPL-MCNC: 166 MG/DL (ref 0–150)
TSH SERPL DL<=0.05 MIU/L-ACNC: 1.74 UIU/ML (ref 0.27–4.2)
URATE SERPL-MCNC: 6.6 MG/DL (ref 3.4–7)
VIT B12 BLD-MCNC: 597 PG/ML (ref 211–946)
VLDLC SERPL-MCNC: 28 MG/DL (ref 5–40)

## 2023-08-12 PROCEDURE — 82306 VITAMIN D 25 HYDROXY: CPT | Performed by: INTERNAL MEDICINE

## 2023-08-12 PROCEDURE — 84153 ASSAY OF PSA TOTAL: CPT | Performed by: INTERNAL MEDICINE

## 2023-08-12 PROCEDURE — 84550 ASSAY OF BLOOD/URIC ACID: CPT | Performed by: INTERNAL MEDICINE

## 2023-08-12 PROCEDURE — 80061 LIPID PANEL: CPT | Performed by: INTERNAL MEDICINE

## 2023-08-12 PROCEDURE — 83036 HEMOGLOBIN GLYCOSYLATED A1C: CPT | Performed by: INTERNAL MEDICINE

## 2023-08-12 PROCEDURE — 82607 VITAMIN B-12: CPT | Performed by: INTERNAL MEDICINE

## 2023-08-12 PROCEDURE — 84443 ASSAY THYROID STIM HORMONE: CPT | Performed by: INTERNAL MEDICINE

## 2023-08-12 PROCEDURE — 36415 COLL VENOUS BLD VENIPUNCTURE: CPT | Performed by: INTERNAL MEDICINE

## 2023-08-12 PROCEDURE — 86140 C-REACTIVE PROTEIN: CPT | Performed by: INTERNAL MEDICINE

## 2023-08-25 ENCOUNTER — TRANSCRIBE ORDERS (OUTPATIENT)
Dept: ADMINISTRATIVE | Facility: HOSPITAL | Age: 63
End: 2023-08-25
Payer: COMMERCIAL

## 2023-08-25 DIAGNOSIS — M54.50 LOW BACK PAIN, UNSPECIFIED BACK PAIN LATERALITY, UNSPECIFIED CHRONICITY, UNSPECIFIED WHETHER SCIATICA PRESENT: Primary | ICD-10-CM

## 2023-08-27 ENCOUNTER — HOSPITAL ENCOUNTER (OUTPATIENT)
Dept: MRI IMAGING | Facility: HOSPITAL | Age: 63
Discharge: HOME | End: 2023-08-27
Admitting: INTERNAL MEDICINE
Payer: COMMERCIAL

## 2023-08-27 DIAGNOSIS — M54.50 LOW BACK PAIN, UNSPECIFIED BACK PAIN LATERALITY, UNSPECIFIED CHRONICITY, UNSPECIFIED WHETHER SCIATICA PRESENT: ICD-10-CM

## 2023-08-27 PROCEDURE — 72148 MRI LUMBAR SPINE W/O DYE: CPT

## 2023-11-30 NOTE — TELEPHONE ENCOUNTER
Fax refill request for Rx Atorvastatin 40 mg denied. Will need to contact PCP for further refills.

## 2024-05-21 ENCOUNTER — TELEPHONE (OUTPATIENT)
Age: 64
End: 2024-05-21
Payer: COMMERCIAL

## 2024-05-21 NOTE — TELEPHONE ENCOUNTER
febuxostat (ULORIC) 40 MG tablet     INDIA FROM [email or web address]  IS REQUESTING A PRIOR AUTH RENEWAL FOR THE ABOVE MEDICATION    [phone]    CASE #: [id number]    -ZC

## 2024-05-21 NOTE — TELEPHONE ENCOUNTER
Initiated PA on cover my meds for uloric. It stated there was one open. I called and spoke with Hellen at [facility].  I answered some questions regarding other meds used like Allopurinol.  She expedited PA.

## 2024-08-13 RX ORDER — FEBUXOSTAT 40 MG/1
40 TABLET, FILM COATED ORAL DAILY
Qty: 90 TABLET | Refills: 2 | OUTPATIENT
Start: 2024-08-13

## 2024-08-13 NOTE — TELEPHONE ENCOUNTER
Pt had a 90 day Rx with 2 refills sent in on 04/30/24 via [facility]. She does not need a refill at this time.

## 2024-08-13 NOTE — TELEPHONE ENCOUNTER
Caller: Rey Jamison    Relationship: Self    Best call back number: [phone]    Requested Prescriptions:   Requested Prescriptions     Pending Prescriptions Disp Refills    febuxostat (ULORIC) 40 MG tablet 90 tablet 2     Sig: Take 1 tablet by mouth Daily.        Pharmacy where request should be sent:    [facility] [phone]       Last office visit with prescribing clinician: Visit date not found   Last telemedicine visit with prescribing clinician: Visit date not found   Next office visit with prescribing clinician: 12/13/2024     Does the patient have less than a 3 day supply:  [] Yes  [] No    Would you like a call back once the refill request has been completed: [] Yes [] No    If the office needs to give you a call back, can they leave a voicemail: [] Yes [] No    Van Tang Rep   08/13/24 15:41 EDT

## 2024-08-15 ENCOUNTER — LAB (OUTPATIENT)
Dept: LAB | Facility: HOSPITAL | Age: 64
End: 2024-08-15
Payer: COMMERCIAL

## 2024-08-15 DIAGNOSIS — E79.0 HYPERURICEMIA: Primary | ICD-10-CM

## 2024-08-15 DIAGNOSIS — E79.0 HYPERURICEMIA: ICD-10-CM

## 2024-08-15 DIAGNOSIS — E55.9 VITAMIN D INSUFFICIENCY: ICD-10-CM

## 2024-08-15 DIAGNOSIS — Z12.5 SCREENING PSA (PROSTATE SPECIFIC ANTIGEN): ICD-10-CM

## 2024-08-15 DIAGNOSIS — M19.90 OSTEOARTHRITIS, UNSPECIFIED OSTEOARTHRITIS TYPE, UNSPECIFIED SITE: ICD-10-CM

## 2024-08-15 LAB
25(OH)D3 SERPL-MCNC: 42.2 NG/ML (ref 30–100)
ALBUMIN SERPL-MCNC: 4.5 G/DL (ref 3.5–5.2)
ALBUMIN/GLOB SERPL: 1.5 G/DL
ALP SERPL-CCNC: 133 U/L (ref 39–117)
ALT SERPL W P-5'-P-CCNC: 36 U/L (ref 1–41)
ANION GAP SERPL CALCULATED.3IONS-SCNC: 7 MMOL/L (ref 5–15)
AST SERPL-CCNC: 29 U/L (ref 1–40)
BASOPHILS # BLD AUTO: 0.05 10*3/MM3 (ref 0–0.2)
BASOPHILS NFR BLD AUTO: 0.9 % (ref 0–1.5)
BILIRUB SERPL-MCNC: 0.4 MG/DL (ref 0–1.2)
BUN SERPL-MCNC: 14 MG/DL (ref 8–23)
BUN/CREAT SERPL: 11.9 (ref 7–25)
CALCIUM SPEC-SCNC: 9.5 MG/DL (ref 8.6–10.5)
CHLORIDE SERPL-SCNC: 107 MMOL/L (ref 98–107)
CHOLEST SERPL-MCNC: 152 MG/DL (ref 0–200)
CO2 SERPL-SCNC: 25 MMOL/L (ref 22–29)
CREAT SERPL-MCNC: 1.18 MG/DL (ref 0.76–1.27)
CRP SERPL-MCNC: <0.3 MG/DL (ref 0–0.5)
DEPRECATED RDW RBC AUTO: 44.1 FL (ref 37–54)
EGFRCR SERPLBLD CKD-EPI 2021: 70.2 ML/MIN/1.73
EOSINOPHIL # BLD AUTO: 0.1 10*3/MM3 (ref 0–0.4)
EOSINOPHIL NFR BLD AUTO: 1.8 % (ref 0.3–6.2)
ERYTHROCYTE [DISTWIDTH] IN BLOOD BY AUTOMATED COUNT: 13.2 % (ref 12.3–15.4)
GLOBULIN UR ELPH-MCNC: 3 GM/DL
GLUCOSE SERPL-MCNC: 110 MG/DL (ref 65–99)
HBA1C MFR BLD: 5.7 % (ref 4.8–5.6)
HCT VFR BLD AUTO: 44.6 % (ref 37.5–51)
HDLC SERPL-MCNC: 43 MG/DL (ref 40–60)
HGB BLD-MCNC: 15.1 G/DL (ref 13–17.7)
IMM GRANULOCYTES # BLD AUTO: 0.02 10*3/MM3 (ref 0–0.05)
IMM GRANULOCYTES NFR BLD AUTO: 0.4 % (ref 0–0.5)
LDLC SERPL CALC-MCNC: 88 MG/DL (ref 0–100)
LDLC/HDLC SERPL: 1.99 {RATIO}
LYMPHOCYTES # BLD AUTO: 1.74 10*3/MM3 (ref 0.7–3.1)
LYMPHOCYTES NFR BLD AUTO: 31.4 % (ref 19.6–45.3)
MCH RBC QN AUTO: 30.8 PG (ref 26.6–33)
MCHC RBC AUTO-ENTMCNC: 33.9 G/DL (ref 31.5–35.7)
MCV RBC AUTO: 91 FL (ref 79–97)
MONOCYTES # BLD AUTO: 0.51 10*3/MM3 (ref 0.1–0.9)
MONOCYTES NFR BLD AUTO: 9.2 % (ref 5–12)
NEUTROPHILS NFR BLD AUTO: 3.12 10*3/MM3 (ref 1.7–7)
NEUTROPHILS NFR BLD AUTO: 56.3 % (ref 42.7–76)
NRBC BLD AUTO-RTO: 0 /100 WBC (ref 0–0.2)
PLATELET # BLD AUTO: 240 10*3/MM3 (ref 140–450)
PMV BLD AUTO: 9.1 FL (ref 6–12)
POTASSIUM SERPL-SCNC: 4 MMOL/L (ref 3.5–5.2)
PROT SERPL-MCNC: 7.5 G/DL (ref 6–8.5)
PSA SERPL-MCNC: 0.34 NG/ML (ref 0–4)
RBC # BLD AUTO: 4.9 10*6/MM3 (ref 4.14–5.8)
SODIUM SERPL-SCNC: 139 MMOL/L (ref 136–145)
TRIGL SERPL-MCNC: 118 MG/DL (ref 0–150)
URATE SERPL-MCNC: 6.7 MG/DL (ref 3.4–7)
VIT B12 BLD-MCNC: 739 PG/ML (ref 211–946)
VLDLC SERPL-MCNC: 21 MG/DL (ref 5–40)
WBC NRBC COR # BLD AUTO: 5.54 10*3/MM3 (ref 3.4–10.8)

## 2024-08-15 PROCEDURE — 82306 VITAMIN D 25 HYDROXY: CPT

## 2024-08-15 PROCEDURE — 86140 C-REACTIVE PROTEIN: CPT

## 2024-08-15 PROCEDURE — 85025 COMPLETE CBC W/AUTO DIFF WBC: CPT

## 2024-08-15 PROCEDURE — G0103 PSA SCREENING: HCPCS

## 2024-08-15 PROCEDURE — 80061 LIPID PANEL: CPT

## 2024-08-15 PROCEDURE — 83036 HEMOGLOBIN GLYCOSYLATED A1C: CPT

## 2024-08-15 PROCEDURE — 36415 COLL VENOUS BLD VENIPUNCTURE: CPT

## 2024-08-15 PROCEDURE — 82607 VITAMIN B-12: CPT

## 2024-08-15 PROCEDURE — 80053 COMPREHEN METABOLIC PANEL: CPT

## 2024-08-15 PROCEDURE — 84550 ASSAY OF BLOOD/URIC ACID: CPT

## 2024-10-23 PROBLEM — M10.9 GOUT: Status: ACTIVE | Noted: 2024-10-23

## 2024-11-11 RX ORDER — FEBUXOSTAT 40 MG/1
40 TABLET, FILM COATED ORAL DAILY
Qty: 90 TABLET | Refills: 2 | Status: SHIPPED | OUTPATIENT
Start: 2024-11-11

## 2024-11-27 ENCOUNTER — TELEPHONE (OUTPATIENT)
Age: 64
End: 2024-11-27
Payer: COMMERCIAL

## 2024-11-27 NOTE — TELEPHONE ENCOUNTER
PT HAD TO BE CANCELLED DUE TO A PROVIDER EMERGENCY. I HAVE REACHED OUT VIA ARTERA, QWiPSHART AND PHONE. IF PT CALLS BACK TO RESCHEDULE, PLEASE SCHEDULE WITH MILLER TELLEZ OR KIMBER. IF THE PT WANTS TO SEE DR. YANCEY, SHE IS BOOKED UNTIL APRIL. IF IT IS A NEW PT, CONFRIM IF THEY ARE INTRESTED IN SEEING ANOTHER PROVIDER OR IF THEY WANT TO WAIT FOR DR. YANCEY. PT HAS BEEN ADDED TO THE CANCELLATION LIST AS HIGH PRIORITY.         -HUB READY TO SCHEDULE.

## 2025-06-10 PROBLEM — R94.5 ABNORMAL LIVER FUNCTION: Status: ACTIVE | Noted: 2025-06-10

## 2025-06-10 PROBLEM — M19.90 OSTEOARTHRITIS: Status: ACTIVE | Noted: 2025-06-10

## 2025-06-10 PROBLEM — M54.50 LUMBAR SPINE PAIN: Status: ACTIVE | Noted: 2025-06-10

## 2025-06-10 NOTE — ASSESSMENT & PLAN NOTE
On Allopurinol 300mg/d.; no flares x Several years.  Currently on Colchicine prn; Uloric daily.  Esr 4 & Crp 0.19 in 8/19.  Uric acid 6.1 in 2/2020  Uric acid 5.5 in 5/21  6.2 Uric acid 5/22  One flare in 18 months.  Lasted one day.  Did not need colchicine..    Continue Uloric 40mg per day.  Colchicine for flare - has not had to use in a long time.  Naproxen prn - has not had to use.   Uric acid in process. Drawn today.  Cbc, cmp Q 6 months

## 2025-06-10 NOTE — ASSESSMENT & PLAN NOTE
8/27/23: MRI showed multilevel lumbar spondylosis, somewhat focally notable at the L4-5 and L5-S1 levels.  There is severe bilateral neuroforaminal stenosis present at L5-S1    Back is doing well.  He does have some posterior right hip pain with standing.  Left hurts on occasion.  No radiation of pain anymore.  He previously had radiation of pain.  Will reorder MRI to follow-up on severe stenosis

## 2025-06-10 NOTE — ASSESSMENT & PLAN NOTE
AST 62/ ALT 36 mildly elevated since 11/17  He is on statin drugs for cholesterol.  Liver US - negative.    Liver enzymes have returned to normal.  Labs in process.

## 2025-06-10 NOTE — ASSESSMENT & PLAN NOTE
Knees hands a feet on exam.  Increased Knee pain .  See HPI.  Tried Pennsaid.    CBD lotion, Biofreeze, Anti inflammatory creams.  Could try ALCIS - Can get from [facility].

## 2025-06-16 ENCOUNTER — OFFICE VISIT (OUTPATIENT)
Age: 65
End: 2025-06-16
Payer: COMMERCIAL

## 2025-06-16 ENCOUNTER — LAB (OUTPATIENT)
Dept: LAB | Facility: HOSPITAL | Age: 65
End: 2025-06-16
Payer: COMMERCIAL

## 2025-06-16 VITALS
WEIGHT: 184 LBS | DIASTOLIC BLOOD PRESSURE: 78 MMHG | SYSTOLIC BLOOD PRESSURE: 122 MMHG | TEMPERATURE: 97.7 F | HEIGHT: 67 IN | BODY MASS INDEX: 28.88 KG/M2 | HEART RATE: 91 BPM

## 2025-06-16 DIAGNOSIS — E78.5 DYSLIPIDEMIA: ICD-10-CM

## 2025-06-16 DIAGNOSIS — M19.90 OSTEOARTHRITIS, UNSPECIFIED OSTEOARTHRITIS TYPE, UNSPECIFIED SITE: ICD-10-CM

## 2025-06-16 DIAGNOSIS — M54.50 LUMBAR SPINE PAIN: ICD-10-CM

## 2025-06-16 DIAGNOSIS — R94.5 ABNORMAL LIVER FUNCTION: ICD-10-CM

## 2025-06-16 DIAGNOSIS — R53.83 FATIGUE, UNSPECIFIED TYPE: ICD-10-CM

## 2025-06-16 DIAGNOSIS — E79.0 HYPERURICEMIA: ICD-10-CM

## 2025-06-16 DIAGNOSIS — Z12.5 SCREENING PSA (PROSTATE SPECIFIC ANTIGEN): ICD-10-CM

## 2025-06-16 DIAGNOSIS — Z00.00 ROUTINE GENERAL MEDICAL EXAMINATION AT A HEALTH CARE FACILITY: ICD-10-CM

## 2025-06-16 DIAGNOSIS — E55.9 VITAMIN D INSUFFICIENCY: ICD-10-CM

## 2025-06-16 DIAGNOSIS — M10.9 GOUT, UNSPECIFIED CAUSE, UNSPECIFIED CHRONICITY, UNSPECIFIED SITE: ICD-10-CM

## 2025-06-16 DIAGNOSIS — M10.9 GOUT, UNSPECIFIED CAUSE, UNSPECIFIED CHRONICITY, UNSPECIFIED SITE: Primary | ICD-10-CM

## 2025-06-16 DIAGNOSIS — M1A.9XX0 CHRONIC GOUT WITHOUT TOPHUS, UNSPECIFIED CAUSE, UNSPECIFIED SITE: ICD-10-CM

## 2025-06-16 LAB
25(OH)D3 SERPL-MCNC: 50.5 NG/ML (ref 30–100)
ALBUMIN SERPL-MCNC: 4.4 G/DL (ref 3.5–5.2)
ALBUMIN/GLOB SERPL: 1.3 G/DL
ALP SERPL-CCNC: 131 U/L (ref 39–117)
ALT SERPL W P-5'-P-CCNC: 46 U/L (ref 1–41)
ANION GAP SERPL CALCULATED.3IONS-SCNC: 11.4 MMOL/L (ref 5–15)
AST SERPL-CCNC: 39 U/L (ref 1–40)
BASOPHILS # BLD AUTO: 0.05 10*3/MM3 (ref 0–0.2)
BASOPHILS NFR BLD AUTO: 0.8 % (ref 0–1.5)
BILIRUB SERPL-MCNC: 0.6 MG/DL (ref 0–1.2)
BUN SERPL-MCNC: 12 MG/DL (ref 8–23)
BUN/CREAT SERPL: 10.1 (ref 7–25)
CALCIUM SPEC-SCNC: 9.7 MG/DL (ref 8.6–10.5)
CHLORIDE SERPL-SCNC: 105 MMOL/L (ref 98–107)
CHOLEST SERPL-MCNC: 136 MG/DL (ref 0–200)
CO2 SERPL-SCNC: 24.6 MMOL/L (ref 22–29)
CREAT SERPL-MCNC: 1.19 MG/DL (ref 0.76–1.27)
CRP SERPL-MCNC: <0.3 MG/DL (ref 0–0.5)
DEPRECATED RDW RBC AUTO: 44.2 FL (ref 37–54)
EGFRCR SERPLBLD CKD-EPI 2021: 69.1 ML/MIN/1.73
EOSINOPHIL # BLD AUTO: 0.15 10*3/MM3 (ref 0–0.4)
EOSINOPHIL NFR BLD AUTO: 2.3 % (ref 0.3–6.2)
ERYTHROCYTE [DISTWIDTH] IN BLOOD BY AUTOMATED COUNT: 13 % (ref 12.3–15.4)
GLOBULIN UR ELPH-MCNC: 3.5 GM/DL
GLUCOSE SERPL-MCNC: 100 MG/DL (ref 65–99)
HBA1C MFR BLD: 5.4 % (ref 4.8–5.6)
HCT VFR BLD AUTO: 46.7 % (ref 37.5–51)
HDLC SERPL-MCNC: 38 MG/DL (ref 40–60)
HGB BLD-MCNC: 15.5 G/DL (ref 13–17.7)
IMM GRANULOCYTES # BLD AUTO: 0.04 10*3/MM3 (ref 0–0.05)
IMM GRANULOCYTES NFR BLD AUTO: 0.6 % (ref 0–0.5)
LDLC SERPL CALC-MCNC: 72 MG/DL (ref 0–100)
LDLC/HDLC SERPL: 1.78 {RATIO}
LYMPHOCYTES # BLD AUTO: 1.97 10*3/MM3 (ref 0.7–3.1)
LYMPHOCYTES NFR BLD AUTO: 30.1 % (ref 19.6–45.3)
MCH RBC QN AUTO: 30.5 PG (ref 26.6–33)
MCHC RBC AUTO-ENTMCNC: 33.2 G/DL (ref 31.5–35.7)
MCV RBC AUTO: 91.7 FL (ref 79–97)
MONOCYTES # BLD AUTO: 0.61 10*3/MM3 (ref 0.1–0.9)
MONOCYTES NFR BLD AUTO: 9.3 % (ref 5–12)
NEUTROPHILS NFR BLD AUTO: 3.73 10*3/MM3 (ref 1.7–7)
NEUTROPHILS NFR BLD AUTO: 56.9 % (ref 42.7–76)
NRBC BLD AUTO-RTO: 0 /100 WBC (ref 0–0.2)
PLATELET # BLD AUTO: 218 10*3/MM3 (ref 140–450)
PMV BLD AUTO: 9.6 FL (ref 6–12)
POTASSIUM SERPL-SCNC: 4 MMOL/L (ref 3.5–5.2)
PROT SERPL-MCNC: 7.9 G/DL (ref 6–8.5)
PSA SERPL-MCNC: 0.31 NG/ML (ref 0–4)
RBC # BLD AUTO: 5.09 10*6/MM3 (ref 4.14–5.8)
SODIUM SERPL-SCNC: 141 MMOL/L (ref 136–145)
TRIGL SERPL-MCNC: 152 MG/DL (ref 0–150)
TSH SERPL DL<=0.05 MIU/L-ACNC: 1.72 UIU/ML (ref 0.27–4.2)
URATE SERPL-MCNC: 5.8 MG/DL (ref 3.4–7)
VLDLC SERPL-MCNC: 26 MG/DL (ref 5–40)
WBC NRBC COR # BLD AUTO: 6.55 10*3/MM3 (ref 3.4–10.8)

## 2025-06-16 PROCEDURE — 84153 ASSAY OF PSA TOTAL: CPT

## 2025-06-16 PROCEDURE — 86140 C-REACTIVE PROTEIN: CPT

## 2025-06-16 PROCEDURE — 83036 HEMOGLOBIN GLYCOSYLATED A1C: CPT

## 2025-06-16 PROCEDURE — 84550 ASSAY OF BLOOD/URIC ACID: CPT

## 2025-06-16 PROCEDURE — 80061 LIPID PANEL: CPT

## 2025-06-16 PROCEDURE — 82306 VITAMIN D 25 HYDROXY: CPT

## 2025-06-16 PROCEDURE — 36415 COLL VENOUS BLD VENIPUNCTURE: CPT

## 2025-06-16 PROCEDURE — 80050 GENERAL HEALTH PANEL: CPT

## 2025-06-16 RX ORDER — TAMSULOSIN HYDROCHLORIDE 0.4 MG/1
CAPSULE ORAL
COMMUNITY
End: 2025-06-16

## 2025-06-16 RX ORDER — FEBUXOSTAT 40 MG/1
40 TABLET, FILM COATED ORAL DAILY
Qty: 90 TABLET | Refills: 1 | Status: SHIPPED | OUTPATIENT
Start: 2025-06-16

## 2025-06-16 NOTE — PROGRESS NOTES
Office Visit       Date: 06/16/2025   Patient Name: Rey Jamison  MRN: [id number]  YOB: 1960    Referring Physician: No ref. provider found     Chief Complaint:   Chief Complaint   Patient presents with    Gout       History of Present Illness: Rey Jamison is a 64 y.o. male who is here today for follow-up of gout.  He reports feeling the same.  He rates his pain 0/10, global 0/10 and no morning stiffness.  He would like refill of Uloric today.      Subjective   Review of Systems:  Review of Systems   Reason unable to perform ROS: Not completed.        Past Medical History:   Past Medical History:   Diagnosis Date    Gout     JIM (nonalcoholic steatohepatitis)     Vitamin B12 deficiency        Past Surgical History:   Past Surgical History:   Procedure Laterality Date    BLEPHAROPLASTY         Family History: No family history on file.    Social History:   Social History     Socioeconomic History    Marital status:    Tobacco Use    Smoking status: Never     Passive exposure: Past    Smokeless tobacco: Never   Vaping Use    Vaping status: Never Used   Substance and Sexual Activity    Alcohol use: Never    Drug use: Never    Sexual activity: Defer       Medications:   Current Outpatient Medications:     alfuzosin (UROXATRAL) 10 MG 24 hr tablet, Take 1 tablet by mouth Daily., Disp: 90 tablet, Rfl: 3    atorvastatin (LIPITOR) 40 MG tablet, TAKE ONE TABLET BY MOUTH DAILY, Disp: 90 tablet, Rfl: 3    colchicine 0.6 MG tablet, Take 1 tablet by mouth 2 (Two) Times a Day., Disp: , Rfl:     febuxostat (ULORIC) 40 MG tablet, Take 1 tablet by mouth Daily., Disp: 90 tablet, Rfl: 1    tadalafil (CIALIS) 5 MG tablet, Take 1 tablet by mouth Daily., Disp: 90 tablet, Rfl: 3    Allergies: No Known Allergies    I have reviewed and updated the patient's chief complaint, history of present illness, review of systems, past medical history, surgical history, family history, social history,  "medications and allergy list as appropriate.     Objective    Vital Signs:   Vitals:    06/16/25 1409   BP: 122/78   BP Location: Left arm   Patient Position: Sitting   Cuff Size: Adult   Pulse: 91   Temp: 97.7 °F (36.5 °C)   Weight: 83.5 kg (184 lb)   Height: 170.2 cm (67\")   PainSc: 0-No pain     Body mass index is 28.82 kg/m².          Physical Exam:  Physical Exam  Vitals reviewed.   Constitutional:       Appearance: Normal appearance.   HENT:      Head: Normocephalic and atraumatic.      Mouth/Throat:      Mouth: Mucous membranes are moist.   Eyes:      Conjunctiva/sclera: Conjunctivae normal.   Cardiovascular:      Rate and Rhythm: Normal rate and regular rhythm.      Pulses: Normal pulses.      Heart sounds: Normal heart sounds.   Pulmonary:      Effort: Pulmonary effort is normal.      Breath sounds: Normal breath sounds.   Musculoskeletal:         General: Normal range of motion.      Cervical back: Normal range of motion and neck supple.      Comments: [place] scattered  Midfoot arthritis bilaterally, worse on right  Crepitus bilateral knees, worse on left.  No soft tissue swelling   Skin:     General: Skin is warm and dry.   Neurological:      General: No focal deficit present.      Mental Status: He is alert and oriented to person, place, and time. Mental status is at baseline.   Psychiatric:         Mood and Affect: Mood normal.         Behavior: Behavior normal.         Thought Content: Thought content normal.         Judgment: Judgment normal.          Results Review:   Imaging Results (Last 24 Hours)       ** No results found for the last 24 hours. **            Procedures    Assessment / Plan    Assessment/Plan:   Diagnoses and all orders for this visit:    1. Gout, unspecified cause, unspecified chronicity, unspecified site (Primary)  Assessment & Plan:  On Allopurinol 300mg/d.; no flares x Several years.  Currently on Colchicine prn; Uloric daily.  Esr 4 & Crp 0.19 in 8/19.  Uric acid 6.1 in " 2/2020  Uric acid 5.5 in 5/21  6.2 Uric acid 5/22  One flare in 18 months.  Lasted one day.  Did not need colchicine..    Continue Uloric 40mg per day.  Colchicine for flare - has not had to use in a long time.  Naproxen prn - has not had to use.   Uric acid in process. Drawn today.  Cbc, cmp Q 6 months      2. Abnormal liver function  Assessment & Plan:  AST 62/ ALT 36 mildly elevated since 11/17  He is on statin drugs for cholesterol.  Liver US - negative.    Liver enzymes have returned to normal.  Labs in process.      3. Osteoarthritis, unspecified osteoarthritis type, unspecified site  Assessment & Plan:  Knees hands a feet on exam.  Increased Knee pain .  See HPI.  Tried Pennsaid.    CBD lotion, Biofreeze, Anti inflammatory creams.  Could try ALCIS - Can get from [employer].        4. Lumbar spine pain  Assessment & Plan:  8/27/23: MRI showed multilevel lumbar spondylosis, somewhat focally notable at the L4-5 and L5-S1 levels.  There is severe bilateral neuroforaminal stenosis present at L5-S1    Back is doing well.  He does have some posterior right hip pain with standing.  Left hurts on occasion.  No radiation of pain anymore.  He previously had radiation of pain.  Will reorder MRI to follow-up on severe stenosis    Orders:  -     MRI Lumbar Spine With & Without Contrast; Future    Other orders  -     febuxostat (ULORIC) 40 MG tablet; Take 1 tablet by mouth Daily.  Dispense: 90 tablet; Refill: 1        Follow Up:   Return in about 1 year (around 6/16/2026) for Dr. Baker.        DELICIA Mack   [facility]

## 2025-06-30 ENCOUNTER — HOSPITAL ENCOUNTER (OUTPATIENT)
Dept: MRI IMAGING | Facility: HOSPITAL | Age: 65
Discharge: HOME | End: 2025-06-30
Admitting: NURSE PRACTITIONER
Payer: COMMERCIAL

## 2025-06-30 DIAGNOSIS — M54.50 LUMBAR SPINE PAIN: ICD-10-CM

## 2025-06-30 PROCEDURE — 25510000002 GADOBENATE DIMEGLUMINE 529 MG/ML SOLUTION: Performed by: NURSE PRACTITIONER

## 2025-06-30 PROCEDURE — A9577 INJ MULTIHANCE: HCPCS | Performed by: NURSE PRACTITIONER

## 2025-06-30 PROCEDURE — 72158 MRI LUMBAR SPINE W/O & W/DYE: CPT

## 2025-06-30 RX ADMIN — GADOBENATE DIMEGLUMINE 17 ML: 529 INJECTION, SOLUTION INTRAVENOUS at 17:57
